# Patient Record
Sex: FEMALE | Race: WHITE | NOT HISPANIC OR LATINO | Employment: FULL TIME | URBAN - METROPOLITAN AREA
[De-identification: names, ages, dates, MRNs, and addresses within clinical notes are randomized per-mention and may not be internally consistent; named-entity substitution may affect disease eponyms.]

---

## 2020-12-04 DIAGNOSIS — Z01.84 ANTIBODY RESPONSE EXAMINATION: ICD-10-CM

## 2021-01-04 ENCOUNTER — IMMUNIZATION (OUTPATIENT)
Dept: PRIMARY CARE CLINIC | Facility: CLINIC | Age: 51
End: 2021-01-04
Payer: OTHER GOVERNMENT

## 2021-01-04 DIAGNOSIS — Z23 NEED FOR VACCINATION: ICD-10-CM

## 2021-01-04 PROCEDURE — 91300 COVID-19, MRNA, LNP-S, PF, 30 MCG/0.3 ML DOSE VACCINE: CPT | Mod: PBBFAC | Performed by: EMERGENCY MEDICINE

## 2021-01-28 ENCOUNTER — IMMUNIZATION (OUTPATIENT)
Dept: PRIMARY CARE CLINIC | Facility: CLINIC | Age: 51
End: 2021-01-28
Payer: OTHER GOVERNMENT

## 2021-01-28 DIAGNOSIS — Z23 NEED FOR VACCINATION: Primary | ICD-10-CM

## 2021-01-28 PROCEDURE — 0002A COVID-19, MRNA, LNP-S, PF, 30 MCG/0.3 ML DOSE VACCINE: ICD-10-PCS | Mod: CV19,S$GLB,, | Performed by: EMERGENCY MEDICINE

## 2021-01-28 PROCEDURE — 91300 COVID-19, MRNA, LNP-S, PF, 30 MCG/0.3 ML DOSE VACCINE: CPT | Mod: S$GLB,,, | Performed by: EMERGENCY MEDICINE

## 2021-01-28 PROCEDURE — 0002A COVID-19, MRNA, LNP-S, PF, 30 MCG/0.3 ML DOSE VACCINE: CPT | Mod: CV19,S$GLB,, | Performed by: EMERGENCY MEDICINE

## 2021-01-28 PROCEDURE — 91300 COVID-19, MRNA, LNP-S, PF, 30 MCG/0.3 ML DOSE VACCINE: ICD-10-PCS | Mod: S$GLB,,, | Performed by: EMERGENCY MEDICINE

## 2021-03-09 ENCOUNTER — CLINICAL SUPPORT (OUTPATIENT)
Dept: OTHER | Facility: CLINIC | Age: 51
End: 2021-03-09

## 2021-03-09 DIAGNOSIS — Z00.8 ENCOUNTER FOR OTHER GENERAL EXAMINATION: ICD-10-CM

## 2021-03-10 VITALS — HEIGHT: 63 IN | BODY MASS INDEX: 22.39 KG/M2

## 2021-03-10 LAB
GLUCOSE SERPL-MCNC: 87 MG/DL (ref 60–140)
HDLC SERPL-MCNC: 48 MG/DL
POC CHOLESTEROL, LDL (DOCK): 86 MG/DL
POC CHOLESTEROL, TOTAL: 170 MG/DL
TRIGL SERPL-MCNC: 179 MG/DL

## 2021-10-01 ENCOUNTER — IMMUNIZATION (OUTPATIENT)
Dept: PRIMARY CARE CLINIC | Facility: CLINIC | Age: 51
End: 2021-10-01
Payer: OTHER GOVERNMENT

## 2021-10-01 DIAGNOSIS — Z23 NEED FOR VACCINATION: Primary | ICD-10-CM

## 2021-10-01 PROCEDURE — 91300 COVID-19, MRNA, LNP-S, PF, 30 MCG/0.3 ML DOSE VACCINE: CPT | Mod: PBBFAC,PN

## 2022-01-05 DIAGNOSIS — R05.9 COUGH: Primary | ICD-10-CM

## 2022-01-05 PROCEDURE — 87811 SARS-COV-2 COVID19 W/OPTIC: CPT | Performed by: PREVENTIVE MEDICINE

## 2022-01-06 ENCOUNTER — LAB VISIT (OUTPATIENT)
Dept: PRIMARY CARE CLINIC | Facility: CLINIC | Age: 52
End: 2022-01-06
Payer: OTHER GOVERNMENT

## 2022-01-06 LAB
CTP QC/QA: YES
SARS-COV-2 AG RESP QL IA.RAPID: POSITIVE

## 2022-01-07 ENCOUNTER — PATIENT MESSAGE (OUTPATIENT)
Dept: INTERNAL MEDICINE | Facility: CLINIC | Age: 52
End: 2022-01-07
Payer: OTHER GOVERNMENT

## 2022-06-18 ENCOUNTER — HOSPITAL ENCOUNTER (INPATIENT)
Facility: HOSPITAL | Age: 52
LOS: 3 days | Discharge: HOME | DRG: 379 | End: 2022-06-21
Attending: FAMILY MEDICINE | Admitting: FAMILY MEDICINE
Payer: COMMERCIAL

## 2022-06-18 ENCOUNTER — ANESTHESIA EVENT (OUTPATIENT)
Dept: ENDOSCOPY | Facility: HOSPITAL | Age: 52
DRG: 379 | End: 2022-06-18
Payer: COMMERCIAL

## 2022-06-18 ENCOUNTER — ANESTHESIA (OUTPATIENT)
Dept: ENDOSCOPY | Facility: HOSPITAL | Age: 52
DRG: 379 | End: 2022-06-18
Payer: COMMERCIAL

## 2022-06-18 DIAGNOSIS — K92.2 UPPER GI BLEED: ICD-10-CM

## 2022-06-18 DIAGNOSIS — K29.90 GASTRITIS AND DUODENITIS: Primary | ICD-10-CM

## 2022-06-18 DIAGNOSIS — K26.9 DUODENAL ULCER: ICD-10-CM

## 2022-06-18 DIAGNOSIS — K92.0 COFFEE GROUND EMESIS: ICD-10-CM

## 2022-06-18 LAB
ABO + RH BLD: NORMAL
ALBUMIN SERPL BCP-MCNC: 3.1 G/DL (ref 3.5–5.2)
ALP SERPL-CCNC: 51 U/L (ref 55–135)
ALT SERPL W/O P-5'-P-CCNC: 9 U/L (ref 10–44)
ANION GAP SERPL CALC-SCNC: 10 MMOL/L (ref 8–16)
AST SERPL-CCNC: 12 U/L (ref 10–40)
BASOPHILS # BLD AUTO: 0.03 K/UL (ref 0–0.2)
BASOPHILS NFR BLD: 0.3 % (ref 0–1.9)
BASOPHILS NFR BLD: 0.4 % (ref 0–1.9)
BASOPHILS NFR BLD: 0.5 % (ref 0–1.9)
BILIRUB SERPL-MCNC: 0.4 MG/DL (ref 0.1–1)
BLD GP AB SCN CELLS X3 SERPL QL: NORMAL
BUN SERPL-MCNC: 35 MG/DL (ref 6–20)
CALCIUM SERPL-MCNC: 8 MG/DL (ref 8.7–10.5)
CHLORIDE SERPL-SCNC: 109 MMOL/L (ref 95–110)
CO2 SERPL-SCNC: 23 MMOL/L (ref 23–29)
CREAT SERPL-MCNC: 0.6 MG/DL (ref 0.5–1.4)
DIFFERENTIAL METHOD: ABNORMAL
EOSINOPHIL # BLD AUTO: 0.1 K/UL (ref 0–0.5)
EOSINOPHIL NFR BLD: 0.6 % (ref 0–8)
EOSINOPHIL NFR BLD: 1 % (ref 0–8)
EOSINOPHIL NFR BLD: 1.2 % (ref 0–8)
ERYTHROCYTE [DISTWIDTH] IN BLOOD BY AUTOMATED COUNT: 14 % (ref 11.5–14.5)
ERYTHROCYTE [DISTWIDTH] IN BLOOD BY AUTOMATED COUNT: 14.2 % (ref 11.5–14.5)
ERYTHROCYTE [DISTWIDTH] IN BLOOD BY AUTOMATED COUNT: 14.4 % (ref 11.5–14.5)
EST. GFR  (AFRICAN AMERICAN): >60 ML/MIN/1.73 M^2
EST. GFR  (NON AFRICAN AMERICAN): >60 ML/MIN/1.73 M^2
FERRITIN SERPL-MCNC: 9 NG/ML (ref 20–300)
GLUCOSE SERPL-MCNC: 102 MG/DL (ref 70–110)
HCT VFR BLD AUTO: 27.1 % (ref 37–48.5)
HCT VFR BLD AUTO: 27.2 % (ref 37–48.5)
HCT VFR BLD AUTO: 29 % (ref 37–48.5)
HGB BLD-MCNC: 8.6 G/DL (ref 12–16)
HGB BLD-MCNC: 8.8 G/DL (ref 12–16)
HGB BLD-MCNC: 9 G/DL (ref 12–16)
IMM GRANULOCYTES # BLD AUTO: 0.02 K/UL (ref 0–0.04)
IMM GRANULOCYTES # BLD AUTO: 0.03 K/UL (ref 0–0.04)
IMM GRANULOCYTES # BLD AUTO: 0.04 K/UL (ref 0–0.04)
IMM GRANULOCYTES NFR BLD AUTO: 0.2 % (ref 0–0.5)
IMM GRANULOCYTES NFR BLD AUTO: 0.5 % (ref 0–0.5)
IMM GRANULOCYTES NFR BLD AUTO: 0.5 % (ref 0–0.5)
INR PPP: 1.3 (ref 0.8–1.2)
IRON SERPL-MCNC: 148 UG/DL (ref 30–160)
LYMPHOCYTES # BLD AUTO: 1 K/UL (ref 1–4.8)
LYMPHOCYTES # BLD AUTO: 1.2 K/UL (ref 1–4.8)
LYMPHOCYTES # BLD AUTO: 1.5 K/UL (ref 1–4.8)
LYMPHOCYTES NFR BLD: 12 % (ref 18–48)
LYMPHOCYTES NFR BLD: 14.4 % (ref 18–48)
LYMPHOCYTES NFR BLD: 22 % (ref 18–48)
MCH RBC QN AUTO: 27.1 PG (ref 27–31)
MCH RBC QN AUTO: 27.6 PG (ref 27–31)
MCH RBC QN AUTO: 27.6 PG (ref 27–31)
MCHC RBC AUTO-ENTMCNC: 31 G/DL (ref 32–36)
MCHC RBC AUTO-ENTMCNC: 31.7 G/DL (ref 32–36)
MCHC RBC AUTO-ENTMCNC: 32.4 G/DL (ref 32–36)
MCV RBC AUTO: 85 FL (ref 82–98)
MCV RBC AUTO: 87 FL (ref 82–98)
MCV RBC AUTO: 87 FL (ref 82–98)
MONOCYTES # BLD AUTO: 0.4 K/UL (ref 0.3–1)
MONOCYTES # BLD AUTO: 0.5 K/UL (ref 0.3–1)
MONOCYTES # BLD AUTO: 0.6 K/UL (ref 0.3–1)
MONOCYTES NFR BLD: 5.2 % (ref 4–15)
MONOCYTES NFR BLD: 5.8 % (ref 4–15)
MONOCYTES NFR BLD: 7.3 % (ref 4–15)
NEUTROPHILS # BLD AUTO: 4.6 K/UL (ref 1.8–7.7)
NEUTROPHILS # BLD AUTO: 6.4 K/UL (ref 1.8–7.7)
NEUTROPHILS # BLD AUTO: 7 K/UL (ref 1.8–7.7)
NEUTROPHILS NFR BLD: 70 % (ref 38–73)
NEUTROPHILS NFR BLD: 76.4 % (ref 38–73)
NEUTROPHILS NFR BLD: 81.7 % (ref 38–73)
NRBC BLD-RTO: 0 /100 WBC
PLATELET # BLD AUTO: 441 K/UL (ref 150–450)
PLATELET # BLD AUTO: 506 K/UL (ref 150–450)
PLATELET # BLD AUTO: 507 K/UL (ref 150–450)
PMV BLD AUTO: 9.3 FL (ref 9.2–12.9)
PMV BLD AUTO: 9.7 FL (ref 9.2–12.9)
PMV BLD AUTO: 9.8 FL (ref 9.2–12.9)
POCT GLUCOSE: 82 MG/DL (ref 70–110)
POTASSIUM SERPL-SCNC: 3.4 MMOL/L (ref 3.5–5.1)
PROT SERPL-MCNC: 5.6 G/DL (ref 6–8.4)
PROTHROMBIN TIME: 12.8 SEC (ref 9–12.5)
RBC # BLD AUTO: 3.12 M/UL (ref 4–5.4)
RBC # BLD AUTO: 3.19 M/UL (ref 4–5.4)
RBC # BLD AUTO: 3.32 M/UL (ref 4–5.4)
SATURATED IRON: 38 % (ref 20–50)
SODIUM SERPL-SCNC: 142 MMOL/L (ref 136–145)
TOTAL IRON BINDING CAPACITY: 392 UG/DL (ref 250–450)
TRANSFERRIN SERPL-MCNC: 265 MG/DL (ref 200–375)
WBC # BLD AUTO: 6.58 K/UL (ref 3.9–12.7)
WBC # BLD AUTO: 8.38 K/UL (ref 3.9–12.7)
WBC # BLD AUTO: 8.6 K/UL (ref 3.9–12.7)

## 2022-06-18 PROCEDURE — 86901 BLOOD TYPING SEROLOGIC RH(D): CPT | Performed by: STUDENT IN AN ORGANIZED HEALTH CARE EDUCATION/TRAINING PROGRAM

## 2022-06-18 PROCEDURE — 25000003 PHARM REV CODE 250: Performed by: STUDENT IN AN ORGANIZED HEALTH CARE EDUCATION/TRAINING PROGRAM

## 2022-06-18 PROCEDURE — 85025 COMPLETE CBC W/AUTO DIFF WBC: CPT | Mod: 91 | Performed by: STUDENT IN AN ORGANIZED HEALTH CARE EDUCATION/TRAINING PROGRAM

## 2022-06-18 PROCEDURE — 43255 EGD CONTROL BLEEDING ANY: CPT | Performed by: INTERNAL MEDICINE

## 2022-06-18 PROCEDURE — 27200635: Performed by: INTERNAL MEDICINE

## 2022-06-18 PROCEDURE — 27200997: Performed by: INTERNAL MEDICINE

## 2022-06-18 PROCEDURE — 94761 N-INVAS EAR/PLS OXIMETRY MLT: CPT

## 2022-06-18 PROCEDURE — 86920 COMPATIBILITY TEST SPIN: CPT | Performed by: STUDENT IN AN ORGANIZED HEALTH CARE EDUCATION/TRAINING PROGRAM

## 2022-06-18 PROCEDURE — 27201038 HC PROBE, BI-POLAR: Performed by: INTERNAL MEDICINE

## 2022-06-18 PROCEDURE — 80053 COMPREHEN METABOLIC PANEL: CPT | Performed by: STUDENT IN AN ORGANIZED HEALTH CARE EDUCATION/TRAINING PROGRAM

## 2022-06-18 PROCEDURE — 37000009 HC ANESTHESIA EA ADD 15 MINS: Performed by: INTERNAL MEDICINE

## 2022-06-18 PROCEDURE — 37000008 HC ANESTHESIA 1ST 15 MINUTES: Performed by: INTERNAL MEDICINE

## 2022-06-18 PROCEDURE — 11000001 HC ACUTE MED/SURG PRIVATE ROOM

## 2022-06-18 PROCEDURE — 84466 ASSAY OF TRANSFERRIN: CPT | Performed by: STUDENT IN AN ORGANIZED HEALTH CARE EDUCATION/TRAINING PROGRAM

## 2022-06-18 PROCEDURE — 36415 COLL VENOUS BLD VENIPUNCTURE: CPT | Performed by: STUDENT IN AN ORGANIZED HEALTH CARE EDUCATION/TRAINING PROGRAM

## 2022-06-18 PROCEDURE — 82728 ASSAY OF FERRITIN: CPT | Performed by: STUDENT IN AN ORGANIZED HEALTH CARE EDUCATION/TRAINING PROGRAM

## 2022-06-18 PROCEDURE — 63600175 PHARM REV CODE 636 W HCPCS: Performed by: STUDENT IN AN ORGANIZED HEALTH CARE EDUCATION/TRAINING PROGRAM

## 2022-06-18 PROCEDURE — 99900035 HC TECH TIME PER 15 MIN (STAT)

## 2022-06-18 PROCEDURE — C9113 INJ PANTOPRAZOLE SODIUM, VIA: HCPCS | Performed by: STUDENT IN AN ORGANIZED HEALTH CARE EDUCATION/TRAINING PROGRAM

## 2022-06-18 PROCEDURE — 85610 PROTHROMBIN TIME: CPT | Performed by: STUDENT IN AN ORGANIZED HEALTH CARE EDUCATION/TRAINING PROGRAM

## 2022-06-18 RX ORDER — IBUPROFEN 200 MG
24 TABLET ORAL
Status: DISCONTINUED | OUTPATIENT
Start: 2022-06-18 | End: 2022-06-21 | Stop reason: HOSPADM

## 2022-06-18 RX ORDER — NALOXONE HCL 0.4 MG/ML
0.02 VIAL (ML) INJECTION
Status: DISCONTINUED | OUTPATIENT
Start: 2022-06-18 | End: 2022-06-21 | Stop reason: HOSPADM

## 2022-06-18 RX ORDER — HYDROCODONE BITARTRATE AND ACETAMINOPHEN 500; 5 MG/1; MG/1
TABLET ORAL
Status: DISCONTINUED | OUTPATIENT
Start: 2022-06-18 | End: 2022-06-21 | Stop reason: HOSPADM

## 2022-06-18 RX ORDER — AMOXICILLIN 250 MG
1 CAPSULE ORAL 2 TIMES DAILY
Status: DISCONTINUED | OUTPATIENT
Start: 2022-06-18 | End: 2022-06-18

## 2022-06-18 RX ORDER — TALC
6 POWDER (GRAM) TOPICAL NIGHTLY PRN
Status: DISCONTINUED | OUTPATIENT
Start: 2022-06-18 | End: 2022-06-21 | Stop reason: HOSPADM

## 2022-06-18 RX ORDER — ACETAMINOPHEN 325 MG/1
650 TABLET ORAL EVERY 8 HOURS PRN
Status: DISCONTINUED | OUTPATIENT
Start: 2022-06-18 | End: 2022-06-21 | Stop reason: HOSPADM

## 2022-06-18 RX ORDER — SODIUM CHLORIDE 0.9 % (FLUSH) 0.9 %
5 SYRINGE (ML) INJECTION
Status: DISCONTINUED | OUTPATIENT
Start: 2022-06-18 | End: 2022-06-21 | Stop reason: HOSPADM

## 2022-06-18 RX ORDER — MUPIROCIN 20 MG/G
OINTMENT TOPICAL 2 TIMES DAILY
Status: DISCONTINUED | OUTPATIENT
Start: 2022-06-18 | End: 2022-06-21 | Stop reason: HOSPADM

## 2022-06-18 RX ORDER — ONDANSETRON 8 MG/1
8 TABLET, ORALLY DISINTEGRATING ORAL EVERY 8 HOURS PRN
Status: DISCONTINUED | OUTPATIENT
Start: 2022-06-18 | End: 2022-06-21 | Stop reason: HOSPADM

## 2022-06-18 RX ORDER — PANTOPRAZOLE SODIUM 40 MG/10ML
40 INJECTION, POWDER, LYOPHILIZED, FOR SOLUTION INTRAVENOUS 2 TIMES DAILY
Status: DISCONTINUED | OUTPATIENT
Start: 2022-06-18 | End: 2022-06-21 | Stop reason: HOSPADM

## 2022-06-18 RX ORDER — IBUPROFEN 200 MG
16 TABLET ORAL
Status: DISCONTINUED | OUTPATIENT
Start: 2022-06-18 | End: 2022-06-21 | Stop reason: HOSPADM

## 2022-06-18 RX ORDER — PANTOPRAZOLE SODIUM 40 MG/10ML
40 INJECTION, POWDER, LYOPHILIZED, FOR SOLUTION INTRAVENOUS 2 TIMES DAILY
Status: DISCONTINUED | OUTPATIENT
Start: 2022-06-18 | End: 2022-06-18

## 2022-06-18 RX ORDER — DEXTROMETHORPHAN/PSEUDOEPHED 2.5-7.5/.8
DROPS ORAL
Status: COMPLETED | OUTPATIENT
Start: 2022-06-18 | End: 2022-06-18

## 2022-06-18 RX ORDER — PROPOFOL 10 MG/ML
VIAL (ML) INTRAVENOUS
Status: DISCONTINUED | OUTPATIENT
Start: 2022-06-18 | End: 2022-06-18

## 2022-06-18 RX ORDER — LIDOCAINE HYDROCHLORIDE 20 MG/ML
INJECTION, SOLUTION EPIDURAL; INFILTRATION; INTRACAUDAL; PERINEURAL
Status: DISCONTINUED | OUTPATIENT
Start: 2022-06-18 | End: 2022-06-18

## 2022-06-18 RX ORDER — ACETAMINOPHEN 325 MG/1
650 TABLET ORAL EVERY 4 HOURS PRN
Status: DISCONTINUED | OUTPATIENT
Start: 2022-06-18 | End: 2022-06-21 | Stop reason: HOSPADM

## 2022-06-18 RX ORDER — SODIUM CHLORIDE 0.9 % (FLUSH) 0.9 %
3 SYRINGE (ML) INJECTION
Status: DISCONTINUED | OUTPATIENT
Start: 2022-06-18 | End: 2022-06-21 | Stop reason: HOSPADM

## 2022-06-18 RX ORDER — GLUCAGON 1 MG
1 KIT INJECTION
Status: DISCONTINUED | OUTPATIENT
Start: 2022-06-18 | End: 2022-06-21 | Stop reason: HOSPADM

## 2022-06-18 RX ADMIN — ACETAMINOPHEN 650 MG: 325 TABLET ORAL at 04:06

## 2022-06-18 RX ADMIN — PROPOFOL 50 MG: 10 INJECTION, EMULSION INTRAVENOUS at 11:06

## 2022-06-18 RX ADMIN — SIMETHICONE 40 MG: 20 SUSPENSION/ DROPS ORAL at 11:06

## 2022-06-18 RX ADMIN — PROPOFOL 20 MG: 10 INJECTION, EMULSION INTRAVENOUS at 11:06

## 2022-06-18 RX ADMIN — PANTOPRAZOLE SODIUM 40 MG: 40 INJECTION, POWDER, FOR SOLUTION INTRAVENOUS at 04:06

## 2022-06-18 RX ADMIN — PROPOFOL 30 MG: 10 INJECTION, EMULSION INTRAVENOUS at 11:06

## 2022-06-18 RX ADMIN — SODIUM CHLORIDE: 0.9 INJECTION, SOLUTION INTRAVENOUS at 10:06

## 2022-06-18 RX ADMIN — PROPOFOL 40 MG: 10 INJECTION, EMULSION INTRAVENOUS at 11:06

## 2022-06-18 RX ADMIN — MUPIROCIN: 20 OINTMENT TOPICAL at 08:06

## 2022-06-18 RX ADMIN — IRON SUCROSE 500 MG: 20 INJECTION, SOLUTION INTRAVENOUS at 08:06

## 2022-06-18 RX ADMIN — PANTOPRAZOLE SODIUM 8 MG/HR: 40 INJECTION, POWDER, FOR SOLUTION INTRAVENOUS at 10:06

## 2022-06-18 RX ADMIN — PANTOPRAZOLE SODIUM 8 MG/HR: 40 INJECTION, POWDER, FOR SOLUTION INTRAVENOUS at 04:06

## 2022-06-18 RX ADMIN — PANTOPRAZOLE SODIUM 8 MG/HR: 40 INJECTION, POWDER, FOR SOLUTION INTRAVENOUS at 05:06

## 2022-06-18 RX ADMIN — LIDOCAINE HYDROCHLORIDE 80 MG: 20 INJECTION, SOLUTION EPIDURAL; INFILTRATION; INTRACAUDAL; PERINEURAL at 11:06

## 2022-06-18 NOTE — PLAN OF CARE
Report received from JOELLEN Mayfield. VSS, AAOx4. Patient is vaccinated, NPO since MN. Patient refusing pregnancy test, informed Dr. Monet with anesthesia and she states that it is ok.

## 2022-06-18 NOTE — CONSULTS
[facility]    CC: Coffee-ground emesis     HPI 51 y.o. female with history of anemia and migraine headaches presenting with acute-onset, large-volume coffee ground emesis with associated diaphoresis, blurred vision, nausea and burning epigastric pain. She was in her normal state of health prior to this episode and denies any prior similar episodes. She admits to having dark stools last night but denies any melena or hematochezia. She takes Excedrin at least once daily for migraines but denies any anticoagulation use. She has a history of anemia but has not taken any iron pills in a few years. She has never had an EGD before.     Chart reviewed and summarized here.    Past Medical History  Past Medical History:   Diagnosis Date    Anemia     Hypothyroidism        Past Surgical History  Past Surgical History:   Procedure Laterality Date    SINUS SURGERY         Social History  Social History     Tobacco Use    Smoking status: Never Smoker    Smokeless tobacco: Never Used   Substance Use Topics    Alcohol use: No    Drug use: No       Family History  No family history of gastrointestinal cancer.     Review of Systems  General ROS: Positive for diaphoresis; negative for chills, fever or weight loss  Psychological ROS: negative for hallucination, depression   Ophthalmic ROS: Positive for blurry vision; negative for photophobia  ENT ROS: negative for epistaxis, sore throat or rhinorrhea  Respiratory ROS: no cough, shortness of breath, or wheezing  Cardiovascular ROS: no chest pain or dyspnea on exertion  Gastrointestinal ROS: Positive for coffee-ground emesis, nausea, and abdominal pain; nochange in bowel habits or black/ bloody stools  Genito-Urinary ROS: no dysuria, trouble voiding, or hematuria  Musculoskeletal ROS: negative for gait disturbance or muscular weakness  Neurological ROS: Positive for lightheadedness; no syncope or seizures; no ataxia  Dermatological ROS: negative for pruritis, rash and  "jaundice    Physical Examination  /77   Pulse 82   Temp 98.5 °F (36.9 °C) (Oral)   Resp 12   Ht 5' 3" (1.6 m)   Wt 61.5 kg (135 lb 9.3 oz)   SpO2 97%   Breastfeeding No   BMI 24.02 kg/m²   General appearance: alert, cooperative, no distress  HENT: Normocephalic, atraumatic, neck symmetrical, no nasal discharge   Eyes: conjunctivae/corneas clear, PERRL, EOM's intact  Lungs: clear to auscultation bilaterally, no dullness to percussion bilaterally  Heart: regular rate and rhythm without rub; no displacement of the PMI   Abdomen: soft, non-tender; bowel sounds normoactive; no organomegaly  Extremities: extremities symmetric; no clubbing, cyanosis, or edema  Integument: Skin color, texture, turgor normal; no rashes; hair distrubution normal  Neurologic: Alert and oriented X 3, normal strength, normal coordination   Psychiatric: no pressured speech; normal affect; no evidence of impaired cognition     Labs:  Hemoglobin 8.8 (baseline ~13)  Platelets 506  BUN 35  Creatinine 0.6    Imaging:  CT head Wo contrast without any acute intracranial process. No evidence of epidural or subdural hematoma.    I have personally reviewed and interpreted these images.    Assessment:   51 y.o. female with history of migraine headaches presenting with coffee-ground emesis with associated decreased hemoglobin from baseline in the setting of regular NSAID use. Differential includes PUD vs. Dieulafoy lesion vs. Angioectasia.     Plan:   - Transfuse PRBCs for hemoglobin <7  - IV PPI BID  - NPO for EGD   - Outpatient colonoscopy for screening purposes    Case discussed with Dr. Evangelista.     Davy Palacios MD  [facility] Fellow, PGY-4    "

## 2022-06-18 NOTE — EICU
EICU BRIEF ADMIT NOTE:    Reason for ICU admission:  GI bleeding    Please refer to admission H&P for details.     Comfortably lying in the bed.  Not in distress.    Chart reviewed: yes  Recent MD notes reviewed: Yes  Labs results reviewed: yes  Radiology: Chest images reviewed. Reports for others reviewed.  Telemetry tracing: yes  Evaluation via interactive audio and video telecommunications: yes comfortably lying in the bed.  Not in distress.  Communicated issues/orders/plan with: bedside ICU RN    Best Practices Review:  Stress ulcer prophylaxis: PPI  DVT prophylaxis: patient is at risk of bleeding  Blood glucose monitoring: Ordered        Ventilator review:  Intubated : No      Assessment & Plan:     Impression:  GI bleeding likely upper in nature      Plan:  Continue to monitor H and H every 6 hours.  Transfuse PRBC if indicated.  Continue pantoprazole.  Keep her NPO.  GI consult for possible endoscopy.  Zofran p.r.n. for nausea vomiting.  Continue to monitor hemodynamics closely.      Thank you for allowing the EICU to participate in your patient's care. Please feel free to call us as needed.     I have encourage bedside RN to call me with the results of labs/imaging if ordered.         Nicolette Escalante MD  [facility]  [phone]

## 2022-06-18 NOTE — LETTER
June 21, 2022                   [facility]  [address]  [place]  [address]  Phone: [phone]  Fax: [phone] June 21, 2022     Patient: Lila Gee   YOB: 1970       To Whom It May Concern:    Lila Gee was admitted to the hospital on 6/18/2022  2:49 AM and discharged on .  She may return with no restrictions on 6/22/2022.  If you have any questions or concerns, please don't hesitate to call Dr. Sabrina Starks MD, [facility] office at [phone].      Sincerely,        Sabrina Starks MD  Department of Hospital Medicine

## 2022-06-18 NOTE — TRANSFER OF CARE
"Anesthesia Transfer of Care Note    Patient: Lila Gee    Procedure(s) Performed: Procedure(s) (LRB):  EGD (ESOPHAGOGASTRODUODENOSCOPY) (N/A)    Patient location: ICU    Anesthesia Type: MAC    Transport from OR: Transported from OR on 6-10 L/min O2 by face mask with adequate spontaneous ventilation    Post pain: adequate analgesia    Post assessment: no apparent anesthetic complications and tolerated procedure well    Post vital signs: stable    Level of consciousness: awake and alert    Nausea/Vomiting: no nausea/vomiting    Complications: none    Transfer of care protocol was followed      Last vitals:   Visit Vitals  /69   Pulse 82   Temp 36.7 °C (98.1 °F) (Oral)   Resp (!) 9   Ht 5' 3" (1.6 m)   Wt 61.5 kg (135 lb 9.3 oz)   SpO2 100%   Breastfeeding No   BMI 24.02 kg/m²     "

## 2022-06-18 NOTE — NURSING
Patient back from EGD, see progress notes, patient with bite block in place, removed at bedside by RN, VSS.

## 2022-06-18 NOTE — PLAN OF CARE
[facility] Plan of Care Note:    EGD performed with findings below:  - Normal esophagus.   - Erythematous mucosa in the antrum with superficial erosions.   - Non-bleeding duodenal ulcer with a clean ulcer base (Frank Class III).   - Non-bleeding duodenal ulcer with a nonbleeding visible vessel (Frank Class IIa).  Treated with bipolar cautery and hemostatic clips x2.  hemostatic spray applied for hemostasis with no bleeding on completion of the procedure.     Plan:  - Would obtain iron panel, ferritin; replete with oral iron if evidence of iron deficiency   - Continue present medications including IV PPI BID for 72 hours total. After 72 hours, transition to oral PPI BID for 8 weeks.   - Clear liquid diet today; advance diet as tolerated  - Avoid all NSAID medications. Tylenol as needed for headaches (up to 2g daily).    Davy Palacios MD  [facility] Fellow, PGY-4

## 2022-06-18 NOTE — H&P
[facility]  History & Physical    Patient Name: Lila Gee  MRN: [id number]  Patient Class: IP- Inpatient  Admission Date: 6/18/2022  Attending Physician: Alexandra Guadarrama MD   Primary Care Provider: Primary Doctor No         Patient information was obtained from patient and ER records.     Subjective:     Principal Problem:Upper GI bleed    Chief Complaint: No chief complaint on file.       HPI: Patient is a 51 year old female with Pmhx of headaches and thrombocytosis presenting to OSH for same day onset of abdominal pain with emesis. Patient reports she was at work when she developed quick onset of nausea, coffee ground emesis and diaphoresis. Patient immediately wheeled to the ED and workup included CT head without contrast that was negative. Occult blood positive. Patients Hgb 9.4/29.9; NGT placed and noted dark ground return with BR blood. Patient typed and screened. 2 bore iv's obtained and PPI IV started. Patient transferred to [facility] for ICU admission for upper GI bleed.     Patient denies any previous episodes of hemoptysis or GI bleeds. Patient reports over 10 years ago of BC goodys powder use. Patient denies any use currently. Patient reports previously worked up for bleeding, with no source noted or discovered. Patient discharged on iron supplements. Patient reports following with PCP and informed to stop taking po Iron supplements. Patient reports previously followed by heme/onc for thrombocytosis. Patient denies any alcohol use, denies any tobacco use or drug use. Patient denies any NSAID use as well.       Past Medical History:   Diagnosis Date    Anemia     Hypothyroidism        Past Surgical History:   Procedure Laterality Date    SINUS SURGERY         Review of patient's allergies indicates:  No Known Allergies    Current Facility-Administered Medications on File Prior to Encounter   Medication    [COMPLETED] LIDOcaine HCl 2% oral solution 5 mL    [COMPLETED]  ondansetron injection 4 mg    [COMPLETED] pantoprazole injection 80 mg    [COMPLETED] sodium chloride 0.9% bolus 1,000 mL    [DISCONTINUED] pantoprazole 40 mg in  mL infusion (ready to mix system)     Current Outpatient Medications on File Prior to Encounter   Medication Sig    [DISCONTINUED] aspirin (ECOTRIN) 81 MG EC tablet Take 1 tablet (81 mg total) by mouth once daily.    [DISCONTINUED] ferrous sulfate 325 mg (65 mg iron) Tab tablet Take 325 mg by mouth daily with breakfast.     Family History       Problem Relation (Age of Onset)    Diabetes Brother    Fibromyalgia Mother, Sister    Irritable bowel syndrome Mother          Tobacco Use    Smoking status: Never Smoker    Smokeless tobacco: Never Used   Substance and Sexual Activity    Alcohol use: No    Drug use: No    Sexual activity: Not Currently     Partners: Male     Birth control/protection: Condom     Review of Systems   Constitutional:  Negative for chills and fever.   HENT:  Negative for ear pain and sore throat.    Eyes:  Negative for discharge and redness.   Respiratory:  Negative for cough and chest tightness.    Cardiovascular:  Negative for chest pain and leg swelling.   Gastrointestinal:  Positive for abdominal pain, blood in stool, nausea and vomiting. Negative for diarrhea.   Genitourinary:  Negative for dysuria and hematuria.   Musculoskeletal:  Negative for back pain and myalgias.   Skin:  Negative for pallor and rash.   Neurological:  Negative for dizziness and headaches.   Psychiatric/Behavioral:  Negative for confusion. The patient is not nervous/anxious.    Objective:     Vital Signs (Most Recent):  Temp: 98.5 °F (36.9 °C) (06/18/22 0318)  Pulse: 95 (06/18/22 0318)  Resp: 18 (06/18/22 0318)  BP: 123/81 (06/18/22 0318)  SpO2: 100 % (06/18/22 0318) Vital Signs (24h Range):  Temp:  [97.7 °F (36.5 °C)-98.5 °F (36.9 °C)] 98.5 °F (36.9 °C)  Pulse:  [66-95] 95  Resp:  [18-20] 18  SpO2:  [95 %-100 %] 100 %  BP: ()/(42-96)  123/81     Weight: 61.5 kg (135 lb 9.3 oz)  Body mass index is 24.02 kg/m².    Physical Exam  Vitals reviewed.   Constitutional:       General: She is not in acute distress.     Appearance: Normal appearance.   HENT:      Head: Normocephalic and atraumatic.      Nose:      Comments: NGTube in place  Eyes:      General:         Right eye: No discharge.         Left eye: No discharge.      Extraocular Movements: Extraocular movements intact.      Pupils: Pupils are equal, round, and reactive to light.   Cardiovascular:      Rate and Rhythm: Normal rate and regular rhythm.      Pulses: Normal pulses.      Heart sounds: No murmur heard.  Pulmonary:      Effort: Pulmonary effort is normal. No respiratory distress.      Breath sounds: Normal breath sounds. No wheezing.   Abdominal:      General: Abdomen is flat. Bowel sounds are normal.      Palpations: Abdomen is soft.      Tenderness: There is no abdominal tenderness.   Musculoskeletal:         General: No swelling or tenderness.   Skin:     Capillary Refill: Capillary refill takes less than 2 seconds.      Coloration: Skin is not jaundiced.   Neurological:      General: No focal deficit present.      Mental Status: She is alert and oriented to person, place, and time.   Psychiatric:         Mood and Affect: Mood normal.         Behavior: Behavior normal.         CRANIAL NERVES     CN III, IV, VI   Pupils are equal, round, and reactive to light.     Significant Labs: All pertinent labs within the past 24 hours have been reviewed.  A1C: No results for input(s): HGBA1C in the last 4320 hours.  CBC:   Recent Labs   Lab 06/17/22  1628 06/17/22  1854 06/18/22  0036   WBC 6.61  --  6.92   HGB 9.4* 10.4* 10.1*   HCT 29.9* 33.6* 32.7*   *  --  573*     CMP:   Recent Labs   Lab 06/17/22  1628      K 3.6      CO2 23   *   BUN 45*   CREATININE 0.7   CALCIUM 7.7*   PROT 5.7*   ALBUMIN 3.2*   BILITOT 0.3   ALKPHOS 50*   AST 12   ALT 12   ANIONGAP 11    EGFRNONAA >60.0     Lipase:   Recent Labs   Lab 06/17/22  1628   LIPASE 21     Magnesium: No results for input(s): MG in the last 48 hours.  TSH:   Recent Labs   Lab 06/17/22  1628   TSH 3.990       Significant Imaging: I have reviewed all pertinent imaging results/findings within the past 24 hours.    Assessment/Plan:     * Upper GI bleed  Onset prior to arrival  Associated with diaphoresis, coffee ground emesis with BRB emesis  In ED, NGT placed. Hg trended 9.4>10.4>10.1  PPI IV continuous  Maintain 2 bore ivs  Trend H/H q 6hrs  Maintain active type and screen  NGT to intermittent suction  GI consulted, recs appreciated          VTE Risk Mitigation (From admission, onward)         Ordered     IP VTE LOW RISK PATIENT  Once         06/18/22 0253     Place sequential compression device  Until discontinued         06/18/22 0253              Critical care time spent on the evaluation and treatment of severe organ dysfunction, review of pertinent labs and imaging studies, discussions with consulting providers and discussions with patient/family: 45 minutes.     Lata Huddleston MD  Department of Hospital Medicine   [place] - Intensive Care

## 2022-06-18 NOTE — LETTER
June 21, 2022                 [facility]  [address]  [place]  [address]  Phone: [phone]  Fax: [phone] June 21, 2022     Patient: Lila Gee   YOB: 1970       To Whom It May Concern:    Lila Gee was admitted to the hospital on 6/18/2022  2:49 AM and discharged on .  She may return with no restrictions on 6/24/2022.  If you have any questions or concerns, please don't hesitate to call Dr. Sabrina Starks MD, [facility] office at [phone].      Sincerely,        Sabrina Starks MD  Department of Hospital Medicine

## 2022-06-18 NOTE — ANESTHESIA PREPROCEDURE EVALUATION
06/18/2022  Lila Gee is a 51 y.o., female who presented to the ED with coffee ground emesis, scheduled for EGD.    Past Medical History:   Diagnosis Date    Anemia     Hypothyroidism        Past Surgical History:   Procedure Laterality Date    SINUS SURGERY         Pre-op Assessment    I have reviewed the Patient Summary Reports.     I have reviewed the Nursing Notes. I have reviewed the NPO Status.   I have reviewed the Medications.     Review of Systems  Anesthesia Hx:  No problems with previous Anesthesia  History of prior surgery of interest to airway management or planning: (sinus surgery) Denies Family Hx of Anesthesia complications.   Denies Personal Hx of Anesthesia complications.   Social:  Non-Smoker, No Alcohol Use    Hematology/Oncology:     Oncology Normal    -- Anemia: Hematology Comments: thrombocytosis    EENT/Dental:EENT/Dental Normal   Cardiovascular:  Cardiovascular Normal Exercise tolerance: good   Denies Angina.    Pulmonary:  Pulmonary Normal  Denies Shortness of breath.    Renal/:  Renal/ Normal     Hepatic/GI:   Upper GI bleed; Hx of frequent Excedrin use (contains aspirin).   Musculoskeletal:  Musculoskeletal Normal    Neurological:  Neurology Normal    Endocrine:   Hypothyroidism    Dermatological:  Skin Normal    Psych:  Psychiatric Normal           Physical Exam  General: Well nourished, Cooperative, Alert and Oriented    Airway:  Mallampati: II   Mouth Opening: Normal  TM Distance: Normal  Tongue: Normal  Neck ROM: Normal ROM    Dental:        Anesthesia Plan  Type of Anesthesia, risks & benefits discussed:    Anesthesia Type: Gen ETT, MAC  Intra-op Monitoring Plan: Standard ASA Monitors  Post Op Pain Control Plan: multimodal analgesia  Induction:  IV  Airway Plan: Direct  Informed Consent: Informed consent signed with the Patient and all parties understand the risks and  agree with anesthesia plan.  All questions answered.   ASA Score: 3    Ready For Surgery From Anesthesia Perspective.     .

## 2022-06-18 NOTE — CONSULTS
"Pulm/CC Fellow Consult Note    Attending Physician: Alexandra Guadarrama MD      Date of Admit: 2022    Chief Complaint: Hematemesis     History of Present Illness:  Lila Gee is a 51 y.o.  female with a PMHx of headaches and reported thrombocytosis who presented for new onset abdominal pain and bloody emesis. Patient does report a 10 year history of BC goodys powder use, was worked up for GIB in the past but no clear source identified. Patient noted to have a hgb of 9.4 and was transiently hypotensive, but responded to a bolus of NS. Patient currently only complaining of pain from her NGT.     Past Medical History:  Past Medical History:   Diagnosis Date    Anemia     Hypothyroidism        Past Surgical History:  Past Surgical History:   Procedure Laterality Date    SINUS SURGERY         Allergies:  Review of patient's allergies indicates:  No Known Allergies      Family History:  Family History   Problem Relation Age of Onset    Fibromyalgia Mother     Irritable bowel syndrome Mother     Fibromyalgia Sister     Diabetes Brother        Social History:  Social History     Tobacco Use    Smoking status: Never Smoker    Smokeless tobacco: Never Used   Substance Use Topics    Alcohol use: No    Drug use: No       Review of Systems:  Comprehensive ROS negative except as per HPI       Objective:   Last 24 Hour Vital Signs:  BP  Min: 72/47  Max: 161/93  Temp  Av °F (36.7 °C)  Min: 97.6 °F (36.4 °C)  Max: 98.5 °F (36.9 °C)  Pulse  Av.8  Min: 66  Max: 116  Resp  Av.1  Min: 9  Max: 42  SpO2  Av.2 %  Min: 90 %  Max: 100 %  Height  Av' 3" (160 cm)  Min: 5' 3" (160 cm)  Max: 5' 3" (160 cm)  Weight  Av.2 kg (137 lb 3.2 oz)  Min: 61.5 kg (135 lb 9.3 oz)  Max: 62.6 kg (138 lb 0.1 oz)  Body mass index is 24.02 kg/m².  I/O last 3 completed shifts:  In: -   Out: 30 [Drains:30]    Physical Exam:  General: Alert and awake in NAD  HENT:  NCAT; anicteric sclera; OP clear with MMM. NGT in " place  Cardio:  Regular rate and rhythm with normal S1 and S2; no murmurs or rubs  Resp:  CTAB; respirations unlabored; no wheezes, crackles or rhonchi  Abdom:  Soft, NTND with normoactive bowel sounds  Extrem:  WWP with no clubbing, cyanosis or edema  Pulses:  2+ and symmetric distally  Neuro:  AAOx3; cooperative and pleasant with no focal deficits    Personal Review and Summary of Interval Diagnostics    Laboratory Studies:   No results for input(s): PH, PCO2, PO2, HCO3, POCSATURATED, BE in the last 24 hours.  Recent Labs   Lab 06/18/22  0536   WBC 6.58   RBC 3.19*   HGB 8.8*   HCT 27.2*   *   MCV 85   MCH 27.6   MCHC 32.4     Recent Labs   Lab 06/18/22  0536      K 3.4*      CO2 23   BUN 35*   CREATININE 0.6       Microbiology Data:   Microbiology Results (last 7 days)     ** No results found for the last 168 hours. **             Assessment & Plan:     Lila Gee is a 51 y.o.  female with a PMHx of headaches and reported thrombocytosis who presented for new onset abdominal pain and bloody emesis.    #UGIB  - GI consulted  - Hemodynamically stable.  - maintain 2 large bore IVs  - Suspected PUD 2/2 aspirin use. Avoid aspirins and NSAIDs  - Hemodynamically stable. If remains so after scope would recommend step down to the floor      Thank you for the consult, please call with any questions.     Monisha Phillips MD  Pulmonary and Critical Care Fellow  06/18/2022  1:30 PM    Pt seen and examined with Pulmonary/Critical Care team and this note reviewed and validated with the following additional comments:    Hx sounds like NSAID-induced gastritis of ulcer. Hemodynamically stable.  2 large bore IVs. No active bleeding at present.  EGD pending this AM.    Herson Douglas MD  Phone [phone]

## 2022-06-18 NOTE — HPI
Patient is a 51 year old female with Pmhx of headaches and thrombocytosis presenting to OSH for same day onset of abdominal pain with emesis. Patient reports she was at work when she developed quick onset of nausea, coffee ground emesis and diaphoresis. Patient immediately wheeled to the ED and workup included CT head without contrast that was negative. Occult blood positive. Patients Hgb 9.4/29.9; NGT placed and noted dark ground return with BR blood. Patient typed and screened. 2 bore iv's obtained and PPI IV started. Patient transferred to [facility] for ICU admission for upper GI bleed.     Patient denies any previous episodes of hemoptysis or GI bleeds. Patient reports over 10 years ago of BC goodys powder use. Patient denies any use currently. Patient reports previously worked up for bleeding, with no source noted or discovered. Patient discharged on iron supplements. Patient reports following with PCP and informed to stop taking po Iron supplements. Patient reports previously followed by heme/onc for thrombocytosis. Patient denies any alcohol use, denies any tobacco use or drug use. Patient denies any NSAID use as well.

## 2022-06-18 NOTE — PROGRESS NOTES
Pt arrived to unit. Introduced self as VN for this shift. Admission questions completed by VN. Educated pt on VTE risk, safety precautions, and VN's role in pt care. Opportunity given for pt's questions. All questions answered.      06/18/22 1717   Admission   Initial VN Admission Questions Complete   Communication Issues? None   Shift   Virtual Nurse - Rounding Complete   Virtual Nurse - Patient Verbalized Approval Of Camera Use;VN Rounding   Type of Frequent Check   Type Other (see comments)  (Admission)   Safety/Activity   Patient Rounds bed in low position;call light in patient/parent reach   Safety Promotion/Fall Prevention side rails raised x 2   Positioning   Body Position sitting up in bed   Head of Bed (HOB) Positioning HOB at 60-90 degrees

## 2022-06-18 NOTE — SUBJECTIVE & OBJECTIVE
Past Medical History:   Diagnosis Date    Anemia     Hypothyroidism        Past Surgical History:   Procedure Laterality Date    SINUS SURGERY         Review of patient's allergies indicates:  No Known Allergies    No current facility-administered medications on file prior to encounter.     No current outpatient medications on file prior to encounter.     Family History       Problem Relation (Age of Onset)    Diabetes Brother    Fibromyalgia Mother, Sister    Irritable bowel syndrome Mother          Tobacco Use    Smoking status: Never Smoker    Smokeless tobacco: Never Used   Substance and Sexual Activity    Alcohol use: No    Drug use: No    Sexual activity: Not Currently     Partners: Male     Birth control/protection: Condom     Review of Systems   Constitutional:  Negative for chills and fever.   HENT:  Negative for ear pain and sore throat.    Eyes:  Negative for discharge and redness.   Respiratory:  Negative for cough and chest tightness.    Cardiovascular:  Negative for chest pain and leg swelling.   Gastrointestinal:  Positive for abdominal pain, blood in stool, nausea and vomiting. Negative for diarrhea.   Genitourinary:  Negative for dysuria and hematuria.   Musculoskeletal:  Negative for back pain and myalgias.   Skin:  Negative for pallor and rash.   Neurological:  Negative for dizziness and headaches.   Psychiatric/Behavioral:  Negative for confusion. The patient is not nervous/anxious.    Objective:     Vital Signs (Most Recent):  Temp: 98.4 °F (36.9 °C) (06/19/22 0429)  Pulse: 76 (06/19/22 0429)  Resp: 17 (06/19/22 0429)  BP: 103/70 (06/19/22 0429)  SpO2: 96 % (06/19/22 0824) Vital Signs (24h Range):  Temp:  [97.6 °F (36.4 °C)-98.9 °F (37.2 °C)] 98.4 °F (36.9 °C)  Pulse:  [76-96] 76  Resp:  [9-20] 17  SpO2:  [89 %-100 %] 96 %  BP: (103-116)/(62-79) 103/70     Weight: 66.5 kg (146 lb 9.7 oz)  Body mass index is 25.97 kg/m².    Physical Exam  Vitals reviewed.   Constitutional:       General: She is  not in acute distress.     Appearance: Normal appearance.   HENT:      Head: Normocephalic and atraumatic.   Eyes:      General:         Right eye: No discharge.         Left eye: No discharge.      Extraocular Movements: Extraocular movements intact.      Pupils: Pupils are equal, round, and reactive to light.   Cardiovascular:      Rate and Rhythm: Normal rate and regular rhythm.      Pulses: Normal pulses.      Heart sounds: No murmur heard.  Pulmonary:      Effort: Pulmonary effort is normal. No respiratory distress.      Breath sounds: Normal breath sounds. No wheezing.   Abdominal:      General: Abdomen is flat. Bowel sounds are normal.      Palpations: Abdomen is soft.      Tenderness: There is no abdominal tenderness.   Musculoskeletal:         General: No swelling or tenderness.   Skin:     Capillary Refill: Capillary refill takes less than 2 seconds.      Coloration: Skin is not jaundiced.   Neurological:      General: No focal deficit present.      Mental Status: She is alert and oriented to person, place, and time.   Psychiatric:         Mood and Affect: Mood normal.         Behavior: Behavior normal.         CRANIAL NERVES     CN III, IV, VI   Pupils are equal, round, and reactive to light.     Significant Labs: All pertinent labs within the past 24 hours have been reviewed.  A1C: No results for input(s): HGBA1C in the last 4320 hours.  CBC:   Recent Labs   Lab 06/18/22  1832 06/18/22  2354 06/19/22  0547   WBC 8.38 6.05 5.27   HGB 9.0* 8.2* 8.3*   HCT 29.0* 24.7* 25.4*   * 457* 423     CMP:   Recent Labs   Lab 06/17/22  1628 06/18/22  0536 06/19/22  0210    142 139   K 3.6 3.4* 3.6    109 105   CO2 23 23 24   * 102 88   BUN 45* 35* 15   CREATININE 0.7 0.6 0.6   CALCIUM 7.7* 8.0* 8.2*   PROT 5.7* 5.6* 5.5*   ALBUMIN 3.2* 3.1* 3.2*   BILITOT 0.3 0.4 0.4   ALKPHOS 50* 51* 49*   AST 12 12 12   ALT 12 9* 9*   ANIONGAP 11 10 10   EGFRNONAA >60.0 >60 >60     Lipase:   Recent Labs    Lab 06/17/22  1628   LIPASE 21     Magnesium: No results for input(s): MG in the last 48 hours.  TSH:   Recent Labs   Lab 06/17/22  1628   TSH 3.990       Significant Imaging: I have reviewed all pertinent imaging results/findings within the past 24 hours.

## 2022-06-18 NOTE — ASSESSMENT & PLAN NOTE
Onset prior to arrival  Associated with diaphoresis, coffee ground emesis with BRB emesis  In ED, NGT placed. Hg trended 9.4>10.4>10.1  Repeat Hgb 8.8 > 8.3  Maintain 2 bore ivs  Trend H/H q 6hrs  Maintain active type and screen    GI consulted  EGD performed 6/18 with findings below:  - Normal esophagus.   - Erythematous mucosa in the antrum with superficial erosions.   - Non-bleeding duodenal ulcer with a clean ulcer base (Frank Class III).   - Non-bleeding duodenal ulcer with a nonbleeding visible vessel (Frank Class IIa).  Treated with bipolar cautery and hemostatic clips x2.  hemostatic spray applied for hemostasis with no bleeding on completion of the procedure.      - IV Venofer 500mg day 2 of 2  - IV PPI BID for 72 hours total. After 72 hours, transition to oral PPI BID for 8 weeks.   - Advance diet as tolerated  - Avoid all NSAID medications. Tylenol as needed for headaches (up to 2g daily).

## 2022-06-19 LAB
ALBUMIN SERPL BCP-MCNC: 3.2 G/DL (ref 3.5–5.2)
ALP SERPL-CCNC: 49 U/L (ref 55–135)
ALT SERPL W/O P-5'-P-CCNC: 9 U/L (ref 10–44)
ANION GAP SERPL CALC-SCNC: 10 MMOL/L (ref 8–16)
AST SERPL-CCNC: 12 U/L (ref 10–40)
BASOPHILS # BLD AUTO: 0.03 K/UL (ref 0–0.2)
BASOPHILS # BLD AUTO: 0.04 K/UL (ref 0–0.2)
BASOPHILS NFR BLD: 0.5 % (ref 0–1.9)
BASOPHILS NFR BLD: 0.8 % (ref 0–1.9)
BILIRUB SERPL-MCNC: 0.4 MG/DL (ref 0.1–1)
BUN SERPL-MCNC: 15 MG/DL (ref 6–20)
CALCIUM SERPL-MCNC: 8.2 MG/DL (ref 8.7–10.5)
CHLORIDE SERPL-SCNC: 105 MMOL/L (ref 95–110)
CO2 SERPL-SCNC: 24 MMOL/L (ref 23–29)
CREAT SERPL-MCNC: 0.6 MG/DL (ref 0.5–1.4)
DIFFERENTIAL METHOD: ABNORMAL
DIFFERENTIAL METHOD: ABNORMAL
EOSINOPHIL # BLD AUTO: 0.1 K/UL (ref 0–0.5)
EOSINOPHIL # BLD AUTO: 0.1 K/UL (ref 0–0.5)
EOSINOPHIL NFR BLD: 2 % (ref 0–8)
EOSINOPHIL NFR BLD: 2.3 % (ref 0–8)
ERYTHROCYTE [DISTWIDTH] IN BLOOD BY AUTOMATED COUNT: 14.3 % (ref 11.5–14.5)
ERYTHROCYTE [DISTWIDTH] IN BLOOD BY AUTOMATED COUNT: 14.3 % (ref 11.5–14.5)
EST. GFR  (AFRICAN AMERICAN): >60 ML/MIN/1.73 M^2
EST. GFR  (NON AFRICAN AMERICAN): >60 ML/MIN/1.73 M^2
GLUCOSE SERPL-MCNC: 88 MG/DL (ref 70–110)
HCT VFR BLD AUTO: 24.7 % (ref 37–48.5)
HCT VFR BLD AUTO: 25.4 % (ref 37–48.5)
HGB BLD-MCNC: 8.2 G/DL (ref 12–16)
HGB BLD-MCNC: 8.3 G/DL (ref 12–16)
IMM GRANULOCYTES # BLD AUTO: 0.02 K/UL (ref 0–0.04)
IMM GRANULOCYTES # BLD AUTO: 0.03 K/UL (ref 0–0.04)
IMM GRANULOCYTES NFR BLD AUTO: 0.4 % (ref 0–0.5)
IMM GRANULOCYTES NFR BLD AUTO: 0.5 % (ref 0–0.5)
LYMPHOCYTES # BLD AUTO: 1.2 K/UL (ref 1–4.8)
LYMPHOCYTES # BLD AUTO: 1.5 K/UL (ref 1–4.8)
LYMPHOCYTES NFR BLD: 22 % (ref 18–48)
LYMPHOCYTES NFR BLD: 25 % (ref 18–48)
MCH RBC QN AUTO: 27.9 PG (ref 27–31)
MCH RBC QN AUTO: 28.2 PG (ref 27–31)
MCHC RBC AUTO-ENTMCNC: 32.7 G/DL (ref 32–36)
MCHC RBC AUTO-ENTMCNC: 33.2 G/DL (ref 32–36)
MCV RBC AUTO: 85 FL (ref 82–98)
MCV RBC AUTO: 85 FL (ref 82–98)
MONOCYTES # BLD AUTO: 0.4 K/UL (ref 0.3–1)
MONOCYTES # BLD AUTO: 0.4 K/UL (ref 0.3–1)
MONOCYTES NFR BLD: 6 % (ref 4–15)
MONOCYTES NFR BLD: 6.8 % (ref 4–15)
NEUTROPHILS # BLD AUTO: 3.6 K/UL (ref 1.8–7.7)
NEUTROPHILS # BLD AUTO: 4 K/UL (ref 1.8–7.7)
NEUTROPHILS NFR BLD: 66 % (ref 38–73)
NEUTROPHILS NFR BLD: 67.7 % (ref 38–73)
NRBC BLD-RTO: 0 /100 WBC
NRBC BLD-RTO: 0 /100 WBC
PLATELET # BLD AUTO: 423 K/UL (ref 150–450)
PLATELET # BLD AUTO: 457 K/UL (ref 150–450)
PMV BLD AUTO: 9.5 FL (ref 9.2–12.9)
PMV BLD AUTO: 9.7 FL (ref 9.2–12.9)
POCT GLUCOSE: 101 MG/DL (ref 70–110)
POTASSIUM SERPL-SCNC: 3.6 MMOL/L (ref 3.5–5.1)
PROT SERPL-MCNC: 5.5 G/DL (ref 6–8.4)
RBC # BLD AUTO: 2.91 M/UL (ref 4–5.4)
RBC # BLD AUTO: 2.98 M/UL (ref 4–5.4)
SODIUM SERPL-SCNC: 139 MMOL/L (ref 136–145)
WBC # BLD AUTO: 5.27 K/UL (ref 3.9–12.7)
WBC # BLD AUTO: 6.05 K/UL (ref 3.9–12.7)

## 2022-06-19 PROCEDURE — 11000001 HC ACUTE MED/SURG PRIVATE ROOM

## 2022-06-19 PROCEDURE — 36415 COLL VENOUS BLD VENIPUNCTURE: CPT | Performed by: STUDENT IN AN ORGANIZED HEALTH CARE EDUCATION/TRAINING PROGRAM

## 2022-06-19 PROCEDURE — 85025 COMPLETE CBC W/AUTO DIFF WBC: CPT | Performed by: STUDENT IN AN ORGANIZED HEALTH CARE EDUCATION/TRAINING PROGRAM

## 2022-06-19 PROCEDURE — 80053 COMPREHEN METABOLIC PANEL: CPT | Performed by: STUDENT IN AN ORGANIZED HEALTH CARE EDUCATION/TRAINING PROGRAM

## 2022-06-19 PROCEDURE — 25000003 PHARM REV CODE 250: Performed by: STUDENT IN AN ORGANIZED HEALTH CARE EDUCATION/TRAINING PROGRAM

## 2022-06-19 PROCEDURE — 63600175 PHARM REV CODE 636 W HCPCS: Performed by: STUDENT IN AN ORGANIZED HEALTH CARE EDUCATION/TRAINING PROGRAM

## 2022-06-19 PROCEDURE — C9113 INJ PANTOPRAZOLE SODIUM, VIA: HCPCS | Performed by: STUDENT IN AN ORGANIZED HEALTH CARE EDUCATION/TRAINING PROGRAM

## 2022-06-19 PROCEDURE — 94760 N-INVAS EAR/PLS OXIMETRY 1: CPT

## 2022-06-19 PROCEDURE — 99900035 HC TECH TIME PER 15 MIN (STAT)

## 2022-06-19 PROCEDURE — 94761 N-INVAS EAR/PLS OXIMETRY MLT: CPT

## 2022-06-19 RX ORDER — CALCIUM CARBONATE 200(500)MG
500 TABLET,CHEWABLE ORAL 2 TIMES DAILY
Status: DISCONTINUED | OUTPATIENT
Start: 2022-06-19 | End: 2022-06-21 | Stop reason: HOSPADM

## 2022-06-19 RX ADMIN — PANTOPRAZOLE SODIUM 40 MG: 40 INJECTION, POWDER, FOR SOLUTION INTRAVENOUS at 08:06

## 2022-06-19 RX ADMIN — ACETAMINOPHEN 325 MG: 325 TABLET ORAL at 09:06

## 2022-06-19 RX ADMIN — MUPIROCIN: 20 OINTMENT TOPICAL at 08:06

## 2022-06-19 RX ADMIN — CALCIUM CARBONATE (ANTACID) CHEW TAB 500 MG 500 MG: 500 CHEW TAB at 09:06

## 2022-06-19 RX ADMIN — PANTOPRAZOLE SODIUM 40 MG: 40 INJECTION, POWDER, FOR SOLUTION INTRAVENOUS at 09:06

## 2022-06-19 RX ADMIN — CALCIUM CARBONATE (ANTACID) CHEW TAB 500 MG 500 MG: 500 CHEW TAB at 08:06

## 2022-06-19 RX ADMIN — IRON SUCROSE 500 MG: 20 INJECTION, SOLUTION INTRAVENOUS at 09:06

## 2022-06-19 NOTE — PLAN OF CARE
VN Note: labs, notes, orders, care plan review, will be available as needed.   Problem: Adult Inpatient Plan of Care  Goal: Plan of Care Review  Outcome: Ongoing, Progressing

## 2022-06-19 NOTE — PLAN OF CARE
Pt vitals were maintained, pt tolerated diet well without any c/o n/v. Pt ambulates with standby asst, pt had no BM during the night. Pt tolerated iv iron, pt is Aaox4. Pt recent h/h 8.2/24.7. [facility]   Problem: Adult Inpatient Plan of Care  Goal: Patient-Specific Goal (Individualized)  Outcome: Ongoing, Progressing  Goal: Optimal Comfort and Wellbeing  Outcome: Ongoing, Progressing     Problem: Bleeding (Gastrointestinal Bleeding)  Goal: Hemostasis  Outcome: Ongoing, Progressing     Problem: Pain Acute  Goal: Acceptable Pain Control and Functional Ability  Outcome: Ongoing, Progressing

## 2022-06-19 NOTE — PROGRESS NOTES
"[facility]    CC: Coffee-ground emesis     Subjective:  No acute events overnight. Patient denies any recurrent emesis. She also denies abdominal pain or nausea. She would like to try to eat today.     Past Medical History  Past Medical History:   Diagnosis Date    Anemia     Hypothyroidism      Review of Systems  General ROS: Negative for chills, fever or weight loss  Gastrointestinal ROS: Negative for coffee-ground emesis, nausea, and abdominal pain; no change in bowel habits or black/ bloody stools    Physical Examination  /70 (BP Location: Right arm, Patient Position: Lying)   Pulse 76   Temp 98.4 °F (36.9 °C) (Oral)   Resp 17   Ht 5' 3" (1.6 m)   Wt 66.5 kg (146 lb 9.7 oz)   LMP 06/29/2016   SpO2 (!) 89%   Breastfeeding No   BMI 25.97 kg/m²   General appearance: alert, cooperative, no distress  Abdomen: soft, non-tender; bowel sounds normoactive; no organomegaly    Prior Endoscopy:  EGD 6/18/22  - Normal esophagus.   - Erythematous mucosa in the antrum with superficial erosions.   - Non-bleeding duodenal ulcer with a clean ulcer base (Frank Class III).   - Non-bleeding duodenal ulcer with a nonbleeding visible vessel (Frank Class IIa).  Treated with bipolar cautery and hemostatic clips x2.  hemostatic spray applied for hemostasis with no bleeding on completion of the procedure.     Assessment:   51 y.o. female with history of migraine headaches who presented presented with coffee-ground emesis with associated decreased hemoglobin from baseline and was found to have cratered ulcers in the duodenal bulb (one ulcer with visible vessel treated with bipolar electrocautery, hemostatic clips and hemostatic spray). No further blood loss overnight. Hemoglobin stable.     Plan:   - Continued iron supplementation per primary team   - Continue present medications including IV PPI BID for 72 hours total. After 72 hours, transition to oral PPI BID for 8 weeks.   - Advance diet as tolerated  - " Avoid all NSAID medications. Tylenol as needed for headaches (up to 2g daily).  - Outpatient follow-up with GI to schedule colonoscopy for screening purposes    Case discussed with Dr. Evangelista.     Davy Palacios MD  [facility] Fellow, PGY-4

## 2022-06-19 NOTE — PROGRESS NOTES
[facility] Medicine  Progress Note    Patient Name: Lila Gee  MRN: [id number]  Patient Class: IP- Inpatient   Admission Date: 6/18/2022  Length of Stay: 1 days  Attending Physician: Alexandra Guadarrama MD  Primary Care Provider: Primary Doctor No    Subjective:     Principal Problem:Upper GI bleed    HPI:  Patient is a 51 year old female with Pmhx of headaches and thrombocytosis presenting to OSH for same day onset of abdominal pain with emesis. Patient reports she was at work when she developed quick onset of nausea, coffee ground emesis and diaphoresis. Patient immediately wheeled to the ED and workup included CT head without contrast that was negative. Occult blood positive. Patients Hgb 9.4/29.9; NGT placed and noted dark ground return with BR blood. Patient typed and screened. 2 bore iv's obtained and PPI IV started. Patient transferred to [facility] for ICU admission for upper GI bleed.     Patient denies any previous episodes of hemoptysis or GI bleeds. Patient reports over 10 years ago of BC goodys powder use. Patient denies any use currently. Patient reports previously worked up for bleeding, with no source noted or discovered. Patient discharged on iron supplements. Patient reports following with PCP and informed to stop taking po Iron supplements. Patient reports previously followed by heme/onc for thrombocytosis. Patient denies any alcohol use, denies any tobacco use or drug use. Patient denies any NSAID use as well.         Past Medical History:   Diagnosis Date    Anemia     Hypothyroidism        Past Surgical History:   Procedure Laterality Date    SINUS SURGERY         Review of patient's allergies indicates:  No Known Allergies    No current facility-administered medications on file prior to encounter.     No current outpatient medications on file prior to encounter.     Family History       Problem Relation (Age of Onset)    Diabetes Brother    Fibromyalgia Mother, Sister    Irritable  bowel syndrome Mother          Tobacco Use    Smoking status: Never Smoker    Smokeless tobacco: Never Used   Substance and Sexual Activity    Alcohol use: No    Drug use: No    Sexual activity: Not Currently     Partners: Male     Birth control/protection: Condom     Review of Systems   Constitutional:  Negative for chills and fever.   HENT:  Negative for ear pain and sore throat.    Eyes:  Negative for discharge and redness.   Respiratory:  Negative for cough and chest tightness.    Cardiovascular:  Negative for chest pain and leg swelling.   Gastrointestinal:  Positive for abdominal pain, blood in stool, nausea and vomiting. Negative for diarrhea.   Genitourinary:  Negative for dysuria and hematuria.   Musculoskeletal:  Negative for back pain and myalgias.   Skin:  Negative for pallor and rash.   Neurological:  Negative for dizziness and headaches.   Psychiatric/Behavioral:  Negative for confusion. The patient is not nervous/anxious.    Objective:     Vital Signs (Most Recent):  Temp: 98.4 °F (36.9 °C) (06/19/22 0429)  Pulse: 76 (06/19/22 0429)  Resp: 17 (06/19/22 0429)  BP: 103/70 (06/19/22 0429)  SpO2: 96 % (06/19/22 0824) Vital Signs (24h Range):  Temp:  [97.6 °F (36.4 °C)-98.9 °F (37.2 °C)] 98.4 °F (36.9 °C)  Pulse:  [76-96] 76  Resp:  [9-20] 17  SpO2:  [89 %-100 %] 96 %  BP: (103-116)/(62-79) 103/70     Weight: 66.5 kg (146 lb 9.7 oz)  Body mass index is 25.97 kg/m².    Physical Exam  Vitals reviewed.   Constitutional:       General: She is not in acute distress.     Appearance: Normal appearance.   HENT:      Head: Normocephalic and atraumatic.   Eyes:      General:         Right eye: No discharge.         Left eye: No discharge.      Extraocular Movements: Extraocular movements intact.      Pupils: Pupils are equal, round, and reactive to light.   Cardiovascular:      Rate and Rhythm: Normal rate and regular rhythm.      Pulses: Normal pulses.      Heart sounds: No murmur heard.  Pulmonary:       Effort: Pulmonary effort is normal. No respiratory distress.      Breath sounds: Normal breath sounds. No wheezing.   Abdominal:      General: Abdomen is flat. Bowel sounds are normal.      Palpations: Abdomen is soft.      Tenderness: There is no abdominal tenderness.   Musculoskeletal:         General: No swelling or tenderness.   Skin:     Capillary Refill: Capillary refill takes less than 2 seconds.      Coloration: Skin is not jaundiced.   Neurological:      General: No focal deficit present.      Mental Status: She is alert and oriented to person, place, and time.   Psychiatric:         Mood and Affect: Mood normal.         Behavior: Behavior normal.         CRANIAL NERVES     CN III, IV, VI   Pupils are equal, round, and reactive to light.     Significant Labs: All pertinent labs within the past 24 hours have been reviewed.  A1C: No results for input(s): HGBA1C in the last 4320 hours.  CBC:   Recent Labs   Lab 06/18/22  1832 06/18/22  2354 06/19/22  0547   WBC 8.38 6.05 5.27   HGB 9.0* 8.2* 8.3*   HCT 29.0* 24.7* 25.4*   * 457* 423     CMP:   Recent Labs   Lab 06/17/22  1628 06/18/22  0536 06/19/22  0210    142 139   K 3.6 3.4* 3.6    109 105   CO2 23 23 24   * 102 88   BUN 45* 35* 15   CREATININE 0.7 0.6 0.6   CALCIUM 7.7* 8.0* 8.2*   PROT 5.7* 5.6* 5.5*   ALBUMIN 3.2* 3.1* 3.2*   BILITOT 0.3 0.4 0.4   ALKPHOS 50* 51* 49*   AST 12 12 12   ALT 12 9* 9*   ANIONGAP 11 10 10   EGFRNONAA >60.0 >60 >60     Lipase:   Recent Labs   Lab 06/17/22  1628   LIPASE 21     Magnesium: No results for input(s): MG in the last 48 hours.  TSH:   Recent Labs   Lab 06/17/22  1628   TSH 3.990       Significant Imaging: I have reviewed all pertinent imaging results/findings within the past 24 hours.      Assessment/Plan:      * Upper GI bleed  Onset prior to arrival  Associated with diaphoresis, coffee ground emesis with BRB emesis  In ED, NGT placed. Hg trended 9.4>10.4>10.1  Repeat Hgb 8.8 >  8.3  Maintain 2 bore ivs  Trend H/H q 6hrs  Maintain active type and screen    GI consulted  EGD performed 6/18 with findings below:  - Normal esophagus.   - Erythematous mucosa in the antrum with superficial erosions.   - Non-bleeding duodenal ulcer with a clean ulcer base (Frank Class III).   - Non-bleeding duodenal ulcer with a nonbleeding visible vessel (Frank Class IIa).  Treated with bipolar cautery and hemostatic clips x2.  hemostatic spray applied for hemostasis with no bleeding on completion of the procedure.      - IV Venofer 500mg day 2 of 2  - IV PPI BID for 72 hours total. After 72 hours, transition to oral PPI BID for 8 weeks.   - Advance diet as tolerated  - Avoid all NSAID medications. Tylenol as needed for headaches (up to 2g daily).    Gastritis and duodenitis        Coffee ground emesis  Resolved        VTE Risk Mitigation (From admission, onward)         Ordered     IP VTE LOW RISK PATIENT  Once         06/18/22 0253     Place sequential compression device  Until discontinued         06/18/22 0253                Discharge Planning   KENISHA:      Code Status: Full Code   Is the patient medically ready for discharge?:     Reason for patient still in hospital (select all that apply): Patient trending condition, Treatment and Consult recommendations           Sabrina Starks MD, [facility]  [employer] [facility] PGY2  Department of [facility] Medicine   [facility] Surg

## 2022-06-19 NOTE — ANESTHESIA POSTPROCEDURE EVALUATION
Anesthesia Post Evaluation    Patient: Lila Gee    Procedure(s) Performed: Procedure(s) (LRB):  EGD (ESOPHAGOGASTRODUODENOSCOPY) (N/A)    Final Anesthesia Type: MAC      Patient location during evaluation: ICU  Patient participation: Yes- Able to Participate  Level of consciousness: awake and alert and oriented  Post-procedure vital signs: reviewed and stable  Pain management: adequate  Airway patency: patent    PONV status at discharge: No PONV  Anesthetic complications: no      Cardiovascular status: blood pressure returned to baseline  Respiratory status: unassisted  Hydration status: euvolemic  Follow-up not needed.          Vitals Value Taken Time   /72 06/18/22 1915   Temp 36.8 °C (98.2 °F) 06/18/22 1915   Pulse 96 06/18/22 1915   Resp 20 06/18/22 1915   SpO2 96 % 06/18/22 1947         No case tracking events are documented in the log.      Pain/Chapin Score: Pain Rating Prior to Med Admin: 6 (6/18/2022  4:43 PM)

## 2022-06-20 LAB
ALBUMIN SERPL BCP-MCNC: 3.4 G/DL (ref 3.5–5.2)
ALP SERPL-CCNC: 52 U/L (ref 55–135)
ALT SERPL W/O P-5'-P-CCNC: 10 U/L (ref 10–44)
ANION GAP SERPL CALC-SCNC: 11 MMOL/L (ref 8–16)
AST SERPL-CCNC: 12 U/L (ref 10–40)
BASOPHILS # BLD AUTO: 0.02 K/UL (ref 0–0.2)
BASOPHILS NFR BLD: 0.4 % (ref 0–1.9)
BILIRUB SERPL-MCNC: 0.2 MG/DL (ref 0.1–1)
BUN SERPL-MCNC: 9 MG/DL (ref 6–20)
CALCIUM SERPL-MCNC: 8.4 MG/DL (ref 8.7–10.5)
CHLORIDE SERPL-SCNC: 106 MMOL/L (ref 95–110)
CO2 SERPL-SCNC: 25 MMOL/L (ref 23–29)
CREAT SERPL-MCNC: 0.6 MG/DL (ref 0.5–1.4)
DIFFERENTIAL METHOD: ABNORMAL
EOSINOPHIL # BLD AUTO: 0.1 K/UL (ref 0–0.5)
EOSINOPHIL NFR BLD: 3 % (ref 0–8)
ERYTHROCYTE [DISTWIDTH] IN BLOOD BY AUTOMATED COUNT: 14.6 % (ref 11.5–14.5)
EST. GFR  (AFRICAN AMERICAN): >60 ML/MIN/1.73 M^2
EST. GFR  (NON AFRICAN AMERICAN): >60 ML/MIN/1.73 M^2
ESTIMATED AVG GLUCOSE: 126 MG/DL (ref 68–131)
GLUCOSE SERPL-MCNC: 91 MG/DL (ref 70–110)
HBA1C MFR BLD: 6 % (ref 4–5.6)
HCT VFR BLD AUTO: 25.7 % (ref 37–48.5)
HGB BLD-MCNC: 8.3 G/DL (ref 12–16)
IMM GRANULOCYTES # BLD AUTO: 0.01 K/UL (ref 0–0.04)
IMM GRANULOCYTES NFR BLD AUTO: 0.2 % (ref 0–0.5)
LYMPHOCYTES # BLD AUTO: 1.1 K/UL (ref 1–4.8)
LYMPHOCYTES NFR BLD: 24 % (ref 18–48)
MAGNESIUM SERPL-MCNC: 2 MG/DL (ref 1.6–2.6)
MCH RBC QN AUTO: 28.1 PG (ref 27–31)
MCHC RBC AUTO-ENTMCNC: 32.3 G/DL (ref 32–36)
MCV RBC AUTO: 87 FL (ref 82–98)
MONOCYTES # BLD AUTO: 0.4 K/UL (ref 0.3–1)
MONOCYTES NFR BLD: 8.7 % (ref 4–15)
NEUTROPHILS # BLD AUTO: 2.9 K/UL (ref 1.8–7.7)
NEUTROPHILS NFR BLD: 63.7 % (ref 38–73)
NRBC BLD-RTO: 0 /100 WBC
PHOSPHATE SERPL-MCNC: 3.4 MG/DL (ref 2.7–4.5)
PLATELET # BLD AUTO: 413 K/UL (ref 150–450)
PMV BLD AUTO: 9.8 FL (ref 9.2–12.9)
POTASSIUM SERPL-SCNC: 4.3 MMOL/L (ref 3.5–5.1)
PROT SERPL-MCNC: 5.7 G/DL (ref 6–8.4)
RBC # BLD AUTO: 2.95 M/UL (ref 4–5.4)
SODIUM SERPL-SCNC: 142 MMOL/L (ref 136–145)
WBC # BLD AUTO: 4.62 K/UL (ref 3.9–12.7)

## 2022-06-20 PROCEDURE — 11000001 HC ACUTE MED/SURG PRIVATE ROOM

## 2022-06-20 PROCEDURE — 25000003 PHARM REV CODE 250: Performed by: STUDENT IN AN ORGANIZED HEALTH CARE EDUCATION/TRAINING PROGRAM

## 2022-06-20 PROCEDURE — 83036 HEMOGLOBIN GLYCOSYLATED A1C: CPT | Performed by: STUDENT IN AN ORGANIZED HEALTH CARE EDUCATION/TRAINING PROGRAM

## 2022-06-20 PROCEDURE — 94761 N-INVAS EAR/PLS OXIMETRY MLT: CPT

## 2022-06-20 PROCEDURE — 84100 ASSAY OF PHOSPHORUS: CPT | Performed by: STUDENT IN AN ORGANIZED HEALTH CARE EDUCATION/TRAINING PROGRAM

## 2022-06-20 PROCEDURE — C9113 INJ PANTOPRAZOLE SODIUM, VIA: HCPCS | Performed by: STUDENT IN AN ORGANIZED HEALTH CARE EDUCATION/TRAINING PROGRAM

## 2022-06-20 PROCEDURE — 99900035 HC TECH TIME PER 15 MIN (STAT)

## 2022-06-20 PROCEDURE — 85025 COMPLETE CBC W/AUTO DIFF WBC: CPT | Performed by: STUDENT IN AN ORGANIZED HEALTH CARE EDUCATION/TRAINING PROGRAM

## 2022-06-20 PROCEDURE — 36415 COLL VENOUS BLD VENIPUNCTURE: CPT | Performed by: STUDENT IN AN ORGANIZED HEALTH CARE EDUCATION/TRAINING PROGRAM

## 2022-06-20 PROCEDURE — 63600175 PHARM REV CODE 636 W HCPCS: Performed by: STUDENT IN AN ORGANIZED HEALTH CARE EDUCATION/TRAINING PROGRAM

## 2022-06-20 PROCEDURE — 83735 ASSAY OF MAGNESIUM: CPT | Performed by: STUDENT IN AN ORGANIZED HEALTH CARE EDUCATION/TRAINING PROGRAM

## 2022-06-20 PROCEDURE — 25000003 PHARM REV CODE 250

## 2022-06-20 PROCEDURE — 80053 COMPREHEN METABOLIC PANEL: CPT | Performed by: STUDENT IN AN ORGANIZED HEALTH CARE EDUCATION/TRAINING PROGRAM

## 2022-06-20 PROCEDURE — 94760 N-INVAS EAR/PLS OXIMETRY 1: CPT

## 2022-06-20 RX ORDER — SODIUM CHLORIDE 9 MG/ML
INJECTION, SOLUTION INTRAVENOUS CONTINUOUS
Status: DISCONTINUED | OUTPATIENT
Start: 2022-06-20 | End: 2022-06-20

## 2022-06-20 RX ORDER — SODIUM CHLORIDE 9 MG/ML
INJECTION, SOLUTION INTRAVENOUS CONTINUOUS
Status: ACTIVE | OUTPATIENT
Start: 2022-06-20 | End: 2022-06-20

## 2022-06-20 RX ORDER — SODIUM CHLORIDE 9 MG/ML
INJECTION, SOLUTION INTRAVENOUS CONTINUOUS
Status: ACTIVE | OUTPATIENT
Start: 2022-06-20 | End: 2022-06-21

## 2022-06-20 RX ADMIN — ACETAMINOPHEN 650 MG: 325 TABLET ORAL at 07:06

## 2022-06-20 RX ADMIN — MUPIROCIN: 20 OINTMENT TOPICAL at 09:06

## 2022-06-20 RX ADMIN — PANTOPRAZOLE SODIUM 40 MG: 40 INJECTION, POWDER, FOR SOLUTION INTRAVENOUS at 08:06

## 2022-06-20 RX ADMIN — ACETAMINOPHEN 650 MG: 325 TABLET ORAL at 09:06

## 2022-06-20 RX ADMIN — CALCIUM CARBONATE (ANTACID) CHEW TAB 500 MG 500 MG: 500 CHEW TAB at 09:06

## 2022-06-20 RX ADMIN — SODIUM CHLORIDE: 0.9 INJECTION, SOLUTION INTRAVENOUS at 05:06

## 2022-06-20 RX ADMIN — ACETAMINOPHEN 650 MG: 325 TABLET ORAL at 11:06

## 2022-06-20 RX ADMIN — PANTOPRAZOLE SODIUM 40 MG: 40 INJECTION, POWDER, FOR SOLUTION INTRAVENOUS at 09:06

## 2022-06-20 RX ADMIN — SODIUM CHLORIDE: 0.9 INJECTION, SOLUTION INTRAVENOUS at 10:06

## 2022-06-20 NOTE — PLAN OF CARE
AAOX4. Regular diet continued. Pt denies N/V, SOB, distress, and pain. C/o intermittent nausea. Tele monitored. Medications given per MAR. No BM's through the night.     Vital signs stable throughout the night, slightly low BP. Afebrile.  Safety precautions maintained. Bed alarm set. Call bell within reach. Patient encouraged to call for assistance.

## 2022-06-20 NOTE — [employer] PATIENT INSTRUCTIONS
Discharge Summary/Instructions after an Endoscopic Procedure  Patient Name: Lila Gee  Patient MRN: [id number]  Patient YOB: 1970 Saturday, June 18, 2022  Nilesh Evangelista MD  Dear patient,  As a result of recent federal legislation (The Federal Cures Act), you may   receive lab or pathology results from your procedure in your [facility]   account before your physician is able to contact you. Your physician or   their representative will relay the results to you with their   recommendations at their soonest availability.  Thank you,  Your health is very important to us during the Covid Crisis. Following your   procedure today, you will receive a daily text for 2 weeks asking about   signs or symptoms of Covid 19.  Please respond to this text when you   receive it so we can follow up and keep you as safe as possible.   RESTRICTIONS:  During your procedure today, you received medications for sedation.  These   medications may affect your judgment, balance and coordination.  Therefore,   for 24 hours, you have the following restrictions:   - DO NOT drive a car, operate machinery, make legal/financial decisions,   sign important papers or drink alcohol.    ACTIVITY:  Today: no heavy lifting, straining or running due to procedural   sedation/anesthesia.  The following day: return to full activity including work.  DIET:  Eat and drink normally unless instructed otherwise.     TREATMENT FOR COMMON SIDE EFFECTS:  - Mild abdominal pain, nausea, belching, bloating or excessive gas:  rest,   eat lightly and use a heating pad.  - Sore Throat: treat with throat lozenges and/or gargle with warm salt   water.  - Because air was used during the procedure, expelling large amounts of air   from your rectum or belching is normal.  - If a bowel prep was taken, you may not have a bowel movement for 1-3 days.    This is normal.  SYMPTOMS TO WATCH FOR AND REPORT TO YOUR PHYSICIAN:  1. Abdominal pain or bloating, other than gas  cramps.  2. Chest pain.  3. Back pain.  4. Signs of infection such as: chills or fever occurring within 24 hours   after the procedure.  5. Rectal bleeding, which would show as bright red, maroon, or black stools.   (A tablespoon of blood from the rectum is not serious, especially if   hemorrhoids are present.)  6. Vomiting.  7. Weakness or dizziness.  GO DIRECTLY TO THE NEAREST EMERGENCY ROOM IF YOU HAVE ANY OF THE FOLLOWING:      Difficulty breathing              Chills and/or fever over 101 F   Persistent vomiting and/or vomiting blood   Severe abdominal pain   Severe chest pain   Black, tarry stools   Bleeding- more than one tablespoon   Any other symptom or condition that you feel may need urgent attention  Your doctor recommends these additional instructions:  If any biopsies were taken, your doctors clinic will contact you in 1 to 2   weeks with any results.  - Return patient to hospital [facility] for ongoing care.   - NPO for 4 hours, then advance to clear liquid diet today, then advance as   tolerated tomorrow.  - IV PPI BID for 72 hours total. After 72 hours, transition to oral PPI for   8 weeks.   - Avoid all NSAID medications. Tylenol as needed for headaches (up to 2g   from all sources daily).  - AM CBC. Will follow up tomorrow. Remainder as outlined per inpatient   notes.  For questions, problems or results please call your physician - Nilesh Evangelista MD.  EMERGENCY PHONE NUMBER: [phone],  LAB RESULTS: [phone]  IF A COMPLICATION OR EMERGENCY SITUATION ARISES AND YOU ARE UNABLE TO REACH   YOUR PHYSICIAN - GO DIRECTLY TO THE EMERGENCY ROOM.  Nilesh Evangelista MD  6/18/2022 12:07:37 PM  This report has been verified and signed electronically.  Dear patient,  As a result of recent federal legislation (The Federal Cures Act), you may   receive lab or pathology results from your procedure in your [facility]   account before your physician is able to contact you. Your physician or   their representative  will relay the results to you with their   recommendations at their soonest availability.  Thank you,  [employer]ATION

## 2022-06-20 NOTE — HOSPITAL COURSE
She was admitted on 6/18 to the [facility] team. Upon admission, IV Protonix was administered for 72 hours. She was also given an 2 iron sucrose infusions. An EGD was performed showing 2 non-bleeding ulcers. Patient is being discharged home in good condition. She is sent home with oral Protonix for 8 weeks and strong recommendations to avoid NSAIDs.

## 2022-06-20 NOTE — PLAN OF CARE
went to meet with patient. Patient reports she is independent at home. She lives with her daughter and grandchildren at home. She does not have any DME or Home Health. Patient still drives, but family will transport home at discharge. She does not have a PCP. Patient is agreeable to [facility] follow-up at discharge. She is also agreeable to GI follow-up here at [facility].  will request appointments from access navigators. Patient encouraged to call with any questions or concerns.  will continue to follow patient through transitions of care and assist with any discharge needs.     Patient Contacts    Name Relation Home Work Mobile   NORMA ORNELAS [phone]  [phone]        06/20/22 1154   Discharge Assessment   Assessment Type Discharge Planning Assessment   Confirmed/corrected address, phone number and insurance Yes   Confirmed Demographics Correct on Facesheet   Source of Information patient   Lives With child(kaden), adult;grandchild(kaden)   Do you expect to return to your current living situation? Yes   Do you have help at home or someone to help you manage your care at home? Yes   Prior to hospitilization cognitive status: Alert/Oriented   Current cognitive status: Alert/Oriented   Walking or Climbing Stairs Difficulty none   Dressing/Bathing Difficulty none   Equipment Currently Used at Home none   Do you take prescription medications? Yes   Do you have prescription coverage? Yes   Do you have any problems affording any of your prescribed medications? No   Is the patient taking medications as prescribed? yes   Who is going to help you get home at discharge? Stephen   How do you get to doctors appointments? car, drives self;family or friend will provide   Are you on dialysis? No   Do you take coumadin? No   Discharge Plan A Home   Discharge Plan B Home with family   DME Needed Upon Discharge  none   Discharge Plan discussed with: Patient   Discharge  Barriers Identified None     Linda Monsivais RN    [phone]

## 2022-06-20 NOTE — PROGRESS NOTES
[facility] Medicine  Progress Note    Patient Name: Lila Gee  MRN: [id number]  Patient Class: IP- Inpatient   Admission Date: 6/18/2022  Length of Stay: 2 days  Attending Physician: Alexandra Guadarrama MD  Primary Care Provider: Primary Doctor No    Subjective:     Principal Problem:Upper GI bleed    HPI:  Patient is a 51 year old female with Pmhx of headaches and thrombocytosis presenting to OSH for same day onset of abdominal pain with emesis. Patient reports she was at work when she developed quick onset of nausea, coffee ground emesis and diaphoresis. Patient immediately wheeled to the ED and workup included CT head without contrast that was negative. Occult blood positive. Patients Hgb 9.4/29.9; NGT placed and noted dark ground return with BR blood. Patient typed and screened. 2 bore iv's obtained and PPI IV started. [place] [facility] [place].     Patient denies any previous episodes of hemoptysis or GI bleeds. Patient reports over 10 years ago of BC goodys powder use. Patient denies any use currently. Patient reports previously worked up for bleeding, with no source noted or discovered. Patient discharged on iron supplements. Patient reports following with PCP and informed to stop taking po Iron supplements. Patient reports previously followed by heme/onc for thrombocytosis. Patient denies any alcohol use, denies any tobacco use or drug use. Patient denies any NSAID use as well.       Interval History: NAEO. Slept poorly. Tolerating diet.     Review of Systems   Constitutional:  Negative for chills and fever.   HENT:  Negative for ear pain and sore throat.    Eyes:  Negative for discharge and redness.   Respiratory:  Negative for cough and chest tightness.    Cardiovascular:  Negative for chest pain and leg swelling.   Gastrointestinal:  Positive for nausea. Negative for abdominal pain, blood in stool, diarrhea and vomiting.   Genitourinary:  Negative for  dysuria and hematuria.   Musculoskeletal:  Negative for back pain and myalgias.   Skin:  Negative for pallor and rash.   Neurological:  Positive for light-headedness. Negative for dizziness and headaches.   Psychiatric/Behavioral:  Negative for confusion. The patient is not nervous/anxious.    Objective:     Vital Signs (Most Recent):  Temp: 99.6 °F (37.6 °C) (06/20/22 0444)  Pulse: 75 (06/20/22 0444)  Resp: 18 (06/20/22 0444)  BP: 90/60 (06/20/22 0450)  SpO2: 97 % (06/20/22 0444) Vital Signs (24h Range):  Temp:  [98 °F (36.7 °C)-99.6 °F (37.6 °C)] 99.6 °F (37.6 °C)  Pulse:  [75-95] 75  Resp:  [18] 18  SpO2:  [96 %-97 %] 97 %  BP: ()/(54-70) 90/60     Weight: 66.5 kg (146 lb 9.7 oz)  Body mass index is 25.97 kg/m².    Intake/Output Summary (Last 24 hours) at 6/20/2022 0755  Last data filed at 6/19/2022 1800  Gross per 24 hour   Intake 600 ml   Output --   Net 600 ml      Physical Exam  Vitals reviewed.   Constitutional:       General: She is not in acute distress.     Appearance: Normal appearance.   HENT:      Head: Normocephalic and atraumatic.   Eyes:      General:         Right eye: No discharge.         Left eye: No discharge.      Extraocular Movements: Extraocular movements intact.      Pupils: Pupils are equal, round, and reactive to light.   Cardiovascular:      Rate and Rhythm: Normal rate and regular rhythm.      Pulses: Normal pulses.      Heart sounds: No murmur heard.  Pulmonary:      Effort: Pulmonary effort is normal. No respiratory distress.      Breath sounds: Normal breath sounds. No wheezing.   Abdominal:      General: Abdomen is flat. Bowel sounds are normal.      Palpations: Abdomen is soft.      Tenderness: There is no abdominal tenderness.   Musculoskeletal:         General: No swelling or tenderness.   Skin:     Capillary Refill: Capillary refill takes less than 2 seconds.      Coloration: Skin is not jaundiced.   Neurological:      General: No focal deficit present.      Mental  Status: She is alert and oriented to person, place, and time.   Psychiatric:         Mood and Affect: Mood normal.         Behavior: Behavior normal.       Significant Labs: All pertinent labs within the past 24 hours have been reviewed.  CBC:   Recent Labs   Lab 06/18/22  2354 06/19/22  0547 06/20/22  0501   WBC 6.05 5.27 4.62   HGB 8.2* 8.3* 8.3*   HCT 24.7* 25.4* 25.7*   * 423 413     CMP:   Recent Labs   Lab 06/19/22  0210 06/20/22  0501    142   K 3.6 4.3    106   CO2 24 25   GLU 88 91   BUN 15 9   CREATININE 0.6 0.6   CALCIUM 8.2* 8.4*   PROT 5.5* 5.7*   ALBUMIN 3.2* 3.4*   BILITOT 0.4 0.2   ALKPHOS 49* 52*   AST 12 12   ALT 9* 10   ANIONGAP 10 11   EGFRNONAA >60 >60     POCT Glucose:   Recent Labs   Lab 06/18/22  1225 06/18/22  1647   POCTGLUCOSE 101 82       Significant Imaging: I have reviewed all pertinent imaging results/findings within the past 24 hours.      Assessment/Plan:      * Upper GI bleed  Onset prior to arrival  Associated with diaphoresis, coffee ground emesis with BRB emesis  In ED, NGT placed. Hg trended 9.4>10.4>10.1  Repeat Hgb 8.8 > 8.3  Maintain 2 bore ivs  Trend H/H q 6hrs  Maintain active type and screen    GI consulted  EGD performed 6/18 with findings below:  - Normal esophagus.   - Erythematous mucosa in the antrum with superficial erosions.   - Non-bleeding duodenal ulcer with a clean ulcer base (Frank Class III).   - Non-bleeding duodenal ulcer with a nonbleeding visible vessel (Frank Class IIa).  Treated with bipolar cautery and hemostatic clips x2.  hemostatic spray applied for hemostasis with no bleeding on completion of the procedure.      - IV Venofer 500mg x2 Completed  -Tolerating diet  - IV PPI BID for 72 hours total (6/21 afternoon). After 72 hours, transition to oral PPI BID for 8 weeks.   - Avoid all NSAID medications. Tylenol as needed for headaches (up to 2g daily).    Duodenal ulcer        Gastritis and duodenitis        Coffee ground  emesis  Resolved        VTE Risk Mitigation (From admission, onward)         Ordered     IP VTE LOW RISK PATIENT  Once         06/18/22 0253     Place sequential compression device  Until discontinued         06/18/22 0253                Discharge Planning   KENISHA:      Code Status: Full Code   Is the patient medically ready for discharge?:     Reason for patient still in hospital (select all that apply): Patient trending condition, Treatment and Pending disposition           Sabrina Starks MD  Department of [facility] Medicine   [facility]

## 2022-06-20 NOTE — NURSING
BP 90/60 HR 75. Pt states that she is normally 's. She denies symptoms currently but states that she felt nauseated a few hours ago. MD made aware. WCTM

## 2022-06-20 NOTE — PROGRESS NOTES
"[facility]    CC: Coffee-ground emesis     Subjective:  Patient c/o nausea around 2am,denies any recurrent emesis or abdominal pain. Reports dark stool last night, denies blood. She has been eating full diet since yesterday.     Past Medical History  Past Medical History:   Diagnosis Date    Anemia     Hypothyroidism      Review of Systems  General ROS: Negative for chills, fever or weight loss  Gastrointestinal ROS: Negative for coffee-ground emesis or abd pain, +nausea; +dark stool    Physical Examination  BP 90/60 (BP Location: Right arm, Patient Position: Lying)   Pulse 75   Temp 99.6 °F (37.6 °C)   Resp 18   Ht 5' 3" (1.6 m)   Wt 66.5 kg (146 lb 9.7 oz)   LMP 06/29/2016   SpO2 97%   Breastfeeding No   BMI 25.97 kg/m²   General appearance: alert, cooperative, no distress  Abdomen: soft, non-tender; bowel sounds normoactive; no organomegaly    Prior Endoscopy:  EGD 6/18/22  - Normal esophagus.   - Erythematous mucosa in the antrum with superficial erosions.   - Non-bleeding duodenal ulcer with a clean ulcer base (Frank Class III).   - Non-bleeding duodenal ulcer with a nonbleeding visible vessel (Frank Class IIa).  Treated with bipolar cautery and hemostatic clips x2.  hemostatic spray applied for hemostasis with no bleeding on completion of the procedure.     Assessment:   51 y.o. female with history of migraine headaches who presented presented with coffee-ground emesis with associated decreased hemoglobin from baseline and was found to have cratered ulcers in the duodenal bulb (one ulcer with visible vessel treated with bipolar electrocautery, hemostatic clips and hemostatic spray). No further blood loss overnight. Hemoglobin stable.     Plan:   - Continue iron supplementation per primary team   - Complete IV PPI x 72hrs today, then continue oral PPI BID for 8 weeks.  - Avoid all NSAID medications. Tylenol as needed for headaches (up to 2g daily).  - Outpatient follow-up with GI to " schedule colonoscopy for screening purposes    Case to be discussed with Dr. Gary Velez, DO  [employer] [facility] PGY2

## 2022-06-20 NOTE — ASSESSMENT & PLAN NOTE
Onset prior to arrival  Associated with diaphoresis, coffee ground emesis with BRB emesis  In ED, NGT placed. Hg trended 9.4>10.4>10.1  Repeat Hgb 8.8 > 8.3  Maintain 2 bore ivs  Trend H/H q 6hrs  Maintain active type and screen    GI consulted  EGD performed 6/18 with findings below:  - Normal esophagus.   - Erythematous mucosa in the antrum with superficial erosions.   - Non-bleeding duodenal ulcer with a clean ulcer base (Frank Class III).   - Non-bleeding duodenal ulcer with a nonbleeding visible vessel (Frank Class IIa).  Treated with bipolar cautery and hemostatic clips x2.  hemostatic spray applied for hemostasis with no bleeding on completion of the procedure.      - IV Venofer 500mg x2 Completed  -Tolerating diet  - IV PPI BID for 72 hours total (6/21 afternoon). After 72 hours, transition to oral PPI BID for 8 weeks.   - Avoid all NSAID medications. Tylenol as needed for headaches (up to 2g daily).

## 2022-06-20 NOTE — SUBJECTIVE & OBJECTIVE
Interval History: NAEO. Slept poorly. Tolerating diet.     Review of Systems   Constitutional:  Negative for chills and fever.   HENT:  Negative for ear pain and sore throat.    Eyes:  Negative for discharge and redness.   Respiratory:  Negative for cough and chest tightness.    Cardiovascular:  Negative for chest pain and leg swelling.   Gastrointestinal:  Positive for nausea. Negative for abdominal pain, blood in stool, diarrhea and vomiting.   Genitourinary:  Negative for dysuria and hematuria.   Musculoskeletal:  Negative for back pain and myalgias.   Skin:  Negative for pallor and rash.   Neurological:  Positive for light-headedness. Negative for dizziness and headaches.   Psychiatric/Behavioral:  Negative for confusion. The patient is not nervous/anxious.    Objective:     Vital Signs (Most Recent):  Temp: 99.6 °F (37.6 °C) (06/20/22 0444)  Pulse: 75 (06/20/22 0444)  Resp: 18 (06/20/22 0444)  BP: 90/60 (06/20/22 0450)  SpO2: 97 % (06/20/22 0444) Vital Signs (24h Range):  Temp:  [98 °F (36.7 °C)-99.6 °F (37.6 °C)] 99.6 °F (37.6 °C)  Pulse:  [75-95] 75  Resp:  [18] 18  SpO2:  [96 %-97 %] 97 %  BP: ()/(54-70) 90/60     Weight: 66.5 kg (146 lb 9.7 oz)  Body mass index is 25.97 kg/m².    Intake/Output Summary (Last 24 hours) at 6/20/2022 0755  Last data filed at 6/19/2022 1800  Gross per 24 hour   Intake 600 ml   Output --   Net 600 ml      Physical Exam  Vitals reviewed.   Constitutional:       General: She is not in acute distress.     Appearance: Normal appearance.   HENT:      Head: Normocephalic and atraumatic.   Eyes:      General:         Right eye: No discharge.         Left eye: No discharge.      Extraocular Movements: Extraocular movements intact.      Pupils: Pupils are equal, round, and reactive to light.   Cardiovascular:      Rate and Rhythm: Normal rate and regular rhythm.      Pulses: Normal pulses.      Heart sounds: No murmur heard.  Pulmonary:      Effort: Pulmonary effort is normal. No  respiratory distress.      Breath sounds: Normal breath sounds. No wheezing.   Abdominal:      General: Abdomen is flat. Bowel sounds are normal.      Palpations: Abdomen is soft.      Tenderness: There is no abdominal tenderness.   Musculoskeletal:         General: No swelling or tenderness.   Skin:     Capillary Refill: Capillary refill takes less than 2 seconds.      Coloration: Skin is not jaundiced.   Neurological:      General: No focal deficit present.      Mental Status: She is alert and oriented to person, place, and time.   Psychiatric:         Mood and Affect: Mood normal.         Behavior: Behavior normal.       Significant Labs: All pertinent labs within the past 24 hours have been reviewed.  CBC:   Recent Labs   Lab 06/18/22  2354 06/19/22  0547 06/20/22  0501   WBC 6.05 5.27 4.62   HGB 8.2* 8.3* 8.3*   HCT 24.7* 25.4* 25.7*   * 423 413     CMP:   Recent Labs   Lab 06/19/22  0210 06/20/22  0501    142   K 3.6 4.3    106   CO2 24 25   GLU 88 91   BUN 15 9   CREATININE 0.6 0.6   CALCIUM 8.2* 8.4*   PROT 5.5* 5.7*   ALBUMIN 3.2* 3.4*   BILITOT 0.4 0.2   ALKPHOS 49* 52*   AST 12 12   ALT 9* 10   ANIONGAP 10 11   EGFRNONAA >60 >60     POCT Glucose:   Recent Labs   Lab 06/18/22  1225 06/18/22  1647   POCTGLUCOSE 101 82       Significant Imaging: I have reviewed all pertinent imaging results/findings within the past 24 hours.

## 2022-06-21 VITALS
OXYGEN SATURATION: 97 % | BODY MASS INDEX: 25.98 KG/M2 | SYSTOLIC BLOOD PRESSURE: 124 MMHG | HEART RATE: 81 BPM | DIASTOLIC BLOOD PRESSURE: 74 MMHG | RESPIRATION RATE: 18 BRPM | TEMPERATURE: 98 F | WEIGHT: 146.63 LBS | HEIGHT: 63 IN

## 2022-06-21 PROBLEM — K92.2 UPPER GI BLEED: Status: RESOLVED | Noted: 2022-06-18 | Resolved: 2022-06-21

## 2022-06-21 LAB
BASOPHILS # BLD AUTO: 0.03 K/UL (ref 0–0.2)
BASOPHILS NFR BLD: 0.8 % (ref 0–1.9)
DIFFERENTIAL METHOD: ABNORMAL
EOSINOPHIL # BLD AUTO: 0.1 K/UL (ref 0–0.5)
EOSINOPHIL NFR BLD: 2.8 % (ref 0–8)
ERYTHROCYTE [DISTWIDTH] IN BLOOD BY AUTOMATED COUNT: 14.9 % (ref 11.5–14.5)
HCT VFR BLD AUTO: 23.6 % (ref 37–48.5)
HCT VFR BLD AUTO: 26.9 % (ref 37–48.5)
HGB BLD-MCNC: 7.5 G/DL (ref 12–16)
HGB BLD-MCNC: 8.5 G/DL (ref 12–16)
IMM GRANULOCYTES # BLD AUTO: 0.02 K/UL (ref 0–0.04)
IMM GRANULOCYTES NFR BLD AUTO: 0.5 % (ref 0–0.5)
LYMPHOCYTES # BLD AUTO: 1.1 K/UL (ref 1–4.8)
LYMPHOCYTES NFR BLD: 28.5 % (ref 18–48)
MCH RBC QN AUTO: 27.8 PG (ref 27–31)
MCHC RBC AUTO-ENTMCNC: 31.8 G/DL (ref 32–36)
MCV RBC AUTO: 87 FL (ref 82–98)
MONOCYTES # BLD AUTO: 0.3 K/UL (ref 0.3–1)
MONOCYTES NFR BLD: 8.6 % (ref 4–15)
NEUTROPHILS # BLD AUTO: 2.3 K/UL (ref 1.8–7.7)
NEUTROPHILS NFR BLD: 58.8 % (ref 38–73)
NRBC BLD-RTO: 0 /100 WBC
PLATELET # BLD AUTO: 354 K/UL (ref 150–450)
PMV BLD AUTO: 9.8 FL (ref 9.2–12.9)
RBC # BLD AUTO: 2.7 M/UL (ref 4–5.4)
WBC # BLD AUTO: 3.97 K/UL (ref 3.9–12.7)

## 2022-06-21 PROCEDURE — 85025 COMPLETE CBC W/AUTO DIFF WBC: CPT | Performed by: STUDENT IN AN ORGANIZED HEALTH CARE EDUCATION/TRAINING PROGRAM

## 2022-06-21 PROCEDURE — 99900035 HC TECH TIME PER 15 MIN (STAT)

## 2022-06-21 PROCEDURE — 25000003 PHARM REV CODE 250: Performed by: STUDENT IN AN ORGANIZED HEALTH CARE EDUCATION/TRAINING PROGRAM

## 2022-06-21 PROCEDURE — 63600175 PHARM REV CODE 636 W HCPCS: Performed by: STUDENT IN AN ORGANIZED HEALTH CARE EDUCATION/TRAINING PROGRAM

## 2022-06-21 PROCEDURE — 94761 N-INVAS EAR/PLS OXIMETRY MLT: CPT

## 2022-06-21 PROCEDURE — 36415 COLL VENOUS BLD VENIPUNCTURE: CPT | Performed by: STUDENT IN AN ORGANIZED HEALTH CARE EDUCATION/TRAINING PROGRAM

## 2022-06-21 PROCEDURE — 85014 HEMATOCRIT: CPT | Performed by: FAMILY MEDICINE

## 2022-06-21 PROCEDURE — C9113 INJ PANTOPRAZOLE SODIUM, VIA: HCPCS | Performed by: STUDENT IN AN ORGANIZED HEALTH CARE EDUCATION/TRAINING PROGRAM

## 2022-06-21 PROCEDURE — 85018 HEMOGLOBIN: CPT | Performed by: FAMILY MEDICINE

## 2022-06-21 PROCEDURE — 36415 COLL VENOUS BLD VENIPUNCTURE: CPT | Performed by: FAMILY MEDICINE

## 2022-06-21 RX ORDER — PANTOPRAZOLE SODIUM 40 MG/1
40 TABLET, DELAYED RELEASE ORAL 2 TIMES DAILY
Qty: 60 TABLET | Refills: 1 | Status: SHIPPED | OUTPATIENT
Start: 2022-06-22 | End: 2022-09-15 | Stop reason: SDUPTHER

## 2022-06-21 RX ORDER — TOPIRAMATE 25 MG/1
TABLET ORAL 2 TIMES DAILY
Status: CANCELLED | OUTPATIENT
Start: 2022-06-21

## 2022-06-21 RX ORDER — TOPIRAMATE 50 MG/1
50 TABLET, FILM COATED ORAL 2 TIMES DAILY
Qty: 60 TABLET | Refills: 2 | Status: SHIPPED | OUTPATIENT
Start: 2022-07-06 | End: 2022-06-30

## 2022-06-21 RX ORDER — TOPIRAMATE 25 MG/1
TABLET ORAL
Qty: 21 TABLET | Refills: 0 | Status: SHIPPED | OUTPATIENT
Start: 2022-06-22 | End: 2022-06-30

## 2022-06-21 RX ORDER — TOPIRAMATE 25 MG/1
25 TABLET ORAL DAILY
Status: DISCONTINUED | OUTPATIENT
Start: 2022-06-21 | End: 2022-06-21 | Stop reason: HOSPADM

## 2022-06-21 RX ADMIN — CALCIUM CARBONATE (ANTACID) CHEW TAB 500 MG 500 MG: 500 CHEW TAB at 09:06

## 2022-06-21 RX ADMIN — PANTOPRAZOLE SODIUM 40 MG: 40 INJECTION, POWDER, FOR SOLUTION INTRAVENOUS at 09:06

## 2022-06-21 RX ADMIN — TOPIRAMATE 25 MG: 25 TABLET, FILM COATED ORAL at 10:06

## 2022-06-21 RX ADMIN — MUPIROCIN: 20 OINTMENT TOPICAL at 09:06

## 2022-06-21 RX ADMIN — ACETAMINOPHEN 650 MG: 325 TABLET ORAL at 11:06

## 2022-06-21 RX ADMIN — ACETAMINOPHEN 650 MG: 325 TABLET ORAL at 07:06

## 2022-06-21 NOTE — NURSING
Pt discharged to care of family/self. VN reviewed AVS, pt denies questions. Return to work letter given to pt. All personal belongings with pt at time of discharge.

## 2022-06-21 NOTE — PLAN OF CARE
AAOX4. LR infused per order. Regular diet continued. Pt denies N/V, SOB, distress. C/O headache earlier in the night relieved with PRN tylenol, fluids and heat compress. BP monitored. Medications given per MAR.    Vital signs stable throughout the night. Safety precautions maintained. Bed alarm set. Call bell within reach. Patient encouraged to call for assistance. Will continue to monitor.

## 2022-06-21 NOTE — DISCHARGE SUMMARY
[facility] Medicine  Discharge Summary      Patient Name: Lila Gee  MRN: [id number]  Patient Class: IP- Inpatient  Admission Date: 6/18/2022  Hospital Length of Stay: 3 days  Discharge Date and Time:  06/21/2022 9:56 AM  Attending Physician: Ortega Devine MD   Discharging Provider: Sabrian Starks MD  Primary Care Provider: Primary Doctor No      HPI:   Patient is a 51 year old female with Pmhx of headaches and thrombocytosis presenting to OSH for same day onset of abdominal pain with emesis. Patient reports she was at work when she developed quick onset of nausea, coffee ground emesis and diaphoresis. Patient immediately wheeled to the ED and workup included CT head without contrast that was negative. Occult blood positive. Patients Hgb 9.4/29.9; NGT placed and noted dark ground return with BR blood. Patient typed and screened. 2 bore iv's obtained and PPI IV started. [place] [facility] [place] bleed.     Patient denies any previous episodes of hemoptysis or GI bleeds. Patient reports over 10 years ago of BC goodys powder use. Patient denies any use currently. Patient reports previously worked up for bleeding, with no source noted or discovered. Patient discharged on iron supplements. Patient reports following with PCP and informed to stop taking po Iron supplements. Patient reports previously followed by heme/onc for thrombocytosis. Patient denies any alcohol use, denies any tobacco use or drug use. Patient denies any NSAID use as well.       Procedure(s) (LRB):  EGD (ESOPHAGOGASTRODUODENOSCOPY) (N/A)      Hospital Course:   She was [facility] on 6/[facility]. Upon admission, IV Protonix was administered for 72 hours. She was also given an 2 iron sucrose infusions. An EGD was performed showing 2 non-bleeding ulcers. Patient is being discharged home in good condition. She is sent home with oral Protonix for 8 weeks and strong  recommendations to avoid NSAIDs.        Goals of Care Treatment Preferences:  Code Status: Full Code      Consults:   Consults (From admission, onward)        Status Ordering Provider     IP consult to case management  Once        Provider:  (Not yet assigned)    Acknowledged KO OLIVER     Inpatient consult to Gastroenterology  Once        Provider:  Davy Palacios MD    Completed BEV GASTON        Physical Exam:  Gen: Well nourished and developed, NAD, appears stated age  HEENT: normocephalic, atraumatic, PERRLA,  Neck: trachea midline  CV: RRR, S1 and S2 present, no murmurs, no LE edema, radial and dorsalis pedis pulses equal and present bilaterally  Resp: breathing comfortably on room air, CTAB, no wheezes or crackles  Abd: Non-distended, BSx4, non-tender, tympanic throughout  Skin: No rashes or abnormal skin lesions, no apparent jaundice or bruising  Neuro: A&Ox4, sensation intact throughout, spontaneous movements  MSK: Full ROM of all extremities  Psych: Logical thought process, answers questions appropriately      Duodenal ulcer  - oral PPI BID for 8 weeks.   - Avoid all NSAID medications. Tylenol as needed for headaches (up to 2g daily).    Gastritis and duodenitis          Final Active Diagnoses:    Diagnosis Date Noted POA    Gastritis and duodenitis [K29.90]  Yes    Duodenal ulcer [K26.9]  Unknown      Problems Resolved During this Admission:    Diagnosis Date Noted Date Resolved POA    PRINCIPAL PROBLEM:  Upper GI bleed [K92.2] 06/18/2022 06/21/2022 Yes    Coffee ground emesis [K92.0]  06/21/2022 Yes       Discharged Condition: stable    Disposition: Home or Self Care    Follow Up:    Patient Instructions:      Diet Adult Regular     Notify your health care provider if you experience any of the following:  increased confusion or weakness     Notify your health care provider if you experience any of the following:  persistent dizziness, light-headedness, or visual disturbances     Notify  your health care provider if you experience any of the following:  worsening rash     Notify your health care provider if you experience any of the following:  severe persistent headache     Notify your health care provider if you experience any of the following:  difficulty breathing or increased cough     Notify your health care provider if you experience any of the following:  redness, tenderness, or signs of infection (pain, swelling, redness, odor or green/yellow discharge around incision site)     Notify your health care provider if you experience any of the following:  severe uncontrolled pain     Notify your health care provider if you experience any of the following:  persistent nausea and vomiting or diarrhea     Notify your health care provider if you experience any of the following:  temperature >100.4     Activity as tolerated       Significant Diagnostic Studies: Labs: All labs within the past 24 hours have been reviewed    Pending Diagnostic Studies:     None         Medications:  Reconciled Home Medications:      Medication List      START taking these medications    pantoprazole 40 MG tablet  Commonly known as: PROTONIX  Take 1 tablet (40 mg total) by mouth 2 (two) times daily.  Start taking on: June 22, 2022     * topiramate 25 MG tablet  Commonly known as: TOPAMAX  Take 1 tablet (25 mg total) by mouth once daily for 7 days, THEN 1 tablet (25 mg total) 2 (two) times daily for 7 days.  Start taking on: June 22, 2022     * topiramate 50 MG tablet  Commonly known as: TOPAMAX  Take 1 tablet (50 mg total) by mouth 2 (two) times daily.  Start taking on: July 6, 2022         * This list has 2 medication(s) that are the same as other medications prescribed for you. Read the directions carefully, and ask your doctor or other care provider to review them with you.            STOP taking these medications    ferrous sulfate 325 mg (65 mg iron) Tab tablet  Commonly known as: FEOSOL            Indwelling  Lines/Drains at time of discharge:   Lines/Drains/Airways     None                 Time spent on the discharge of patient: 33 minutes         Sabrina Starks MD  Department of Hospital Medicine  [facility]

## 2022-06-21 NOTE — ASSESSMENT & PLAN NOTE
- oral PPI BID for 8 weeks.   - Avoid all NSAID medications. Tylenol as needed for headaches (up to 2g daily).

## 2022-06-21 NOTE — PLAN OF CARE
VN reviewed discharge instructions with pt. Using teach back method.  AVS printed and handed to pt by bedside nurse.  Reviewed follow-up appointments, medications, diet, and importance of medication compliance.  Reviewed home care instructions, treatment plan, self-management, and when to seek medical attention.  Allowed time for questions.  All questions answered.  Patient verbalized complete understanding of discharge instructions and voices no concerns.     Discharge instructions complete.  Bedside delivery done.  Pt waiting on ride home.  Bedside nurse notified.

## 2022-06-21 NOTE — PLAN OF CARE
Discharge orders noted. No DME or Home Health ordered. PCP and GI follow-up appointments from access navigator requested. Awaiting appointments to be scheduled. No further Case Management needs.     PCP and GI follow-up appointments scheduled.    1116-- went to meet with patient to review follow-up information. Patient asleep at this time. No further needs.    4478-- met with patient. She is awaiting her daughter to transport home. She is aware of follow-up information. All questions answered.    Patient Contacts    Name Relation Home Work Mobile   NORMA ORNELAS Daughter [phone]     Future Appointments   Date Time Provider Department Center   6/30/2022  8:00 AM Jose Aguayo PA-C [facility]   7/11/2022  2:45 PM Jaime Vega MD [facility]        06/21/22 1000   Final Note   Assessment Type Final Discharge Note   Anticipated Discharge Disposition Home   Hospital Resources/Appts/Education Provided Appointments scheduled by Navigator/Coordinator   Post-Acute Status   Discharge Delays None known at this time     Linda Monsivais RN    [phone]

## 2022-06-21 NOTE — PLAN OF CARE
Pt remains A+Ox4. Received IVF this AM. BP noted to be lower after fluids stopped, pt with no c/o nausea, dizziness or other symptoms. Persistent headache, some relief from PO tylenol. Voiding freely. Tolerating diet well. Possible discharge tomorrow. Safety precautions maintained.       Problem: Adult Inpatient Plan of Care  Goal: Plan of Care Review  Outcome: Ongoing, Progressing  Goal: Absence of Hospital-Acquired Illness or Injury  Outcome: Ongoing, Progressing  Goal: Readiness for Transition of Care  Outcome: Ongoing, Progressing     Problem: Skin Injury Risk Increased  Goal: Skin Health and Integrity  Outcome: Ongoing, Progressing     Problem: Bleeding (Gastrointestinal Bleeding)  Goal: Hemostasis  Outcome: Ongoing, Progressing     Problem: Nausea and Vomiting  Goal: Fluid and Electrolyte Balance  Outcome: Ongoing, Progressing     Problem: Pain Acute  Goal: Acceptable Pain Control and Functional Ability  Outcome: Ongoing, Progressing

## 2022-06-22 LAB
BLD PROD TYP BPU: NORMAL
BLD PROD TYP BPU: NORMAL
BLOOD UNIT EXPIRATION DATE: NORMAL
BLOOD UNIT EXPIRATION DATE: NORMAL
BLOOD UNIT TYPE CODE: 5100
BLOOD UNIT TYPE CODE: 5100
BLOOD UNIT TYPE: NORMAL
BLOOD UNIT TYPE: NORMAL
CODING SYSTEM: NORMAL
CODING SYSTEM: NORMAL
DISPENSE STATUS: NORMAL
DISPENSE STATUS: NORMAL
TRANS ERYTHROCYTES VOL PATIENT: NORMAL ML
TRANS ERYTHROCYTES VOL PATIENT: NORMAL ML

## 2022-06-22 NOTE — PHYSICIAN QUERY
PT Name: Lila Gee  MR #: [id number]     DOCUMENTATION CLARIFICATION      CDS Katiuska Alexis RN, BSN        Contact Information:    [email or web address]         This form is a permanent document in the medical record.    Query Date: June 22, 2022    By submitting this query, we are merely seeking further clarification of documentation to reflect the severity of illness of your patient. Please utilize your independent clinical judgment when addressing the question(s) below.     The Medical Record contains the following:   Indicators   Supporting Clinical Findings Location in Medical Record    Gastrointestinal Ulcer Documented     X   EGD/Colonscopy Findings Impression:                 - One small non-bleeding duodenal bulb ulcer with a small nonbleeding visible vessel (Frank Class  IIa). This represents the etiology of the patient's prior coffee ground emesis and acute   anemia component. Treated with bipolar cautery and   hemostatic clips x2. Hemostatic spray applied for secondary hemostasis with no active bleeding at end of the procedure.   - One small non-bleeding duodenal bulb ulcer with a clean ulcer base (Frank Class III).    - No specimens collected.  6/18 EGD     Pathology Findings      Radiology Findings     X   Treatment/Medication Upon admission, IV Protonix was administered for 72 hours. She was also given an 2 iron sucrose infusions.  She is sent home with oral Protonix for 8 weeks and strong recommendations to avoid NSAIDs.  6/21 DCS    X   Other 51 year old female with Pmhx of headaches and thrombocytosis presenting to OSH for same day onset of abdominal pain with emesis. Patient reports she was at work when she developed quick onset of nausea, coffee ground emesis and diaphoresis. Occult blood positive. Patients Hgb 9.4/29.9; NGT placed and noted dark ground return with BR blood.  Patient reports over 10 years ago of BC goodys powder use. Patient denies any use currently. Patient reports  previously worked up for bleeding, with no source noted or discovered.  6/18 H&P              Please further specify the acuity of the duodenal  ulcer:    [   ] Acute     [  X ] Chronic     [  ] Acute on chronic      [   ] Clinically Undetermined           Please document in your progress notes daily for the duration of treatment until resolved, and include in your discharge summary.  Form No. [id number]

## 2022-06-30 ENCOUNTER — OFFICE VISIT (OUTPATIENT)
Dept: FAMILY MEDICINE | Facility: HOSPITAL | Age: 52
End: 2022-06-30
Attending: FAMILY MEDICINE
Payer: COMMERCIAL

## 2022-06-30 VITALS
BODY MASS INDEX: 24.34 KG/M2 | HEIGHT: 63 IN | DIASTOLIC BLOOD PRESSURE: 70 MMHG | HEART RATE: 62 BPM | SYSTOLIC BLOOD PRESSURE: 110 MMHG | WEIGHT: 137.38 LBS

## 2022-06-30 DIAGNOSIS — G44.229 CHRONIC TENSION-TYPE HEADACHE, NOT INTRACTABLE: ICD-10-CM

## 2022-06-30 DIAGNOSIS — Z12.31 ENCOUNTER FOR SCREENING MAMMOGRAM FOR MALIGNANT NEOPLASM OF BREAST: ICD-10-CM

## 2022-06-30 DIAGNOSIS — K26.9 DUODENAL ULCER: Primary | ICD-10-CM

## 2022-06-30 DIAGNOSIS — Z12.4 PAP SMEAR FOR CERVICAL CANCER SCREENING: ICD-10-CM

## 2022-06-30 DIAGNOSIS — Z00.00 HEALTHCARE MAINTENANCE: ICD-10-CM

## 2022-06-30 PROCEDURE — 99214 OFFICE O/P EST MOD 30 MIN: CPT | Performed by: PHYSICIAN ASSISTANT

## 2022-06-30 RX ORDER — ATOGEPANT 60 MG/1
TABLET ORAL
COMMUNITY
Start: 2022-06-28

## 2022-06-30 RX ORDER — UBROGEPANT 100 MG/1
TABLET ORAL
COMMUNITY
Start: 2022-06-28

## 2022-06-30 RX ORDER — FOLIC ACID/MULTIVIT,IRON,MINER 0.4MG-18MG
TABLET ORAL
COMMUNITY
Start: 2022-06-22

## 2022-06-30 NOTE — PROGRESS NOTES
Subjective:       Patient ID: Lila Gee is a 51 y.o. female.    Chief Complaint: Hospital Follow Up    Ms. Gee is a 52 y/o female with PMH of migraines here today for hospital follow up. She was recently admitted with GI bleeding. Presented to the ED after coffee-ground emesis. Of note, she had been taking daily NSAIDs due to her chronic headaches. EGD with one small non-bleeding duodenal bulb ulcer with a small nonbleeding visible vessel, which was the likely etiology of her bleed. Scope also showed one small non-bleeding duodenal bulb ulcer. While admitted she was given IV iron, but no blood. Improved during stay and d/c'd on BID PPI.     Today Ms. Gee is unaccompanied. From a GI perspective she is doing well. Denies any abdominal pain, coffee-ground emesis, maggie blood, dark/tarry stools, BRBPR, n/v/d/c. She also denies dizziness, lightheadedness, palpitations. She is taking her PPI as prescribed and has follow up with GI in ~1 week. She does however have a complaint of increased headaches. She is followed by Neurology and had a visit with him yesterday, Dr. Mena. She had previously been on Topamax and he changed her medications yesterday to Qulipta daily for controller and Ubrelvy PRN for abortive. She has not filled her prescriptions yet, but did get samples from his office. She took the abortive therapy x2 yesterday without improvement. She then took Excedrin with only acetaminophen and caffeine with some relief (no NSAIDs). She is very concerned that her PPI might be worsening her headaches, which are interfering with her ability to work.     PCP: Est Here with Dr. Starks  CRCS: Following with Dr. Vega  PAP: Years ago, due. Prefers GYN.     Review of Systems   Constitutional: Positive for activity change.   Cardiovascular: Negative for palpitations.   Neurological: Positive for headaches (worsening). Negative for dizziness and light-headedness.   All other systems reviewed and are  "negative.    Past Medical History:   Diagnosis Date    Anemia     Hypothyroidism      Family History   Problem Relation Age of Onset    Fibromyalgia Mother     Irritable bowel syndrome Mother     Fibromyalgia Sister     Diabetes Brother      Past Surgical History:   Procedure Laterality Date    ESOPHAGOGASTRODUODENOSCOPY N/A 6/18/2022    Procedure: EGD (ESOPHAGOGASTRODUODENOSCOPY);  Surgeon: Jaime Vega MD;  Location: [facility];  Service: Endoscopy;  Laterality: N/A;    SINUS SURGERY       Social History     Socioeconomic History    Marital status: Single   Occupational History    Occupation: lpn   Tobacco Use    Smoking status: Never Smoker    Smokeless tobacco: Never Used   Substance and Sexual Activity    Alcohol use: No    Drug use: No    Sexual activity: Not Currently     Partners: Male     Birth control/protection: Condom     Current Outpatient Medications   Medication Instructions    atogepant (QULIPTA) 60 mg Tab No dose, route, or frequency recorded.    multivitamin-min-iron-FA-vit K (ONE DAILY WOMEN'S) 18 mg iron-400 mcg-25 mcg Tab No dose, route, or frequency recorded.    pantoprazole (PROTONIX) 40 mg, Oral, 2 times daily    [START ON 7/6/2022] topiramate (TOPAMAX) 50 mg, Oral, 2 times daily    ubrogepant (UBROGEPANT) 100 mg tablet No dose, route, or frequency recorded.     Objective:     Vitals:    06/30/22 0815   BP: 110/70   Pulse: 62   Weight: 62.3 kg (137 lb 5.6 oz)   Height: 5' 3" (1.6 m)     Physical Exam  Vitals and nursing note reviewed.   Constitutional:       General: She is not in acute distress.     Appearance: She is well-developed. She is not diaphoretic.   HENT:      Head: Normocephalic and atraumatic.   Eyes:      Extraocular Movements: Extraocular movements intact.      Conjunctiva/sclera: Conjunctivae normal.   Neck:      Trachea: No tracheal deviation.   Cardiovascular:      Rate and Rhythm: Normal rate and regular rhythm.      Heart sounds: Normal heart " sounds. No murmur heard.  Pulmonary:      Effort: Pulmonary effort is normal. No respiratory distress.      Breath sounds: Normal breath sounds. No wheezing.   Chest:      Chest wall: No tenderness.   Abdominal:      General: Bowel sounds are normal. There is no distension.      Palpations: Abdomen is soft.      Tenderness: There is no abdominal tenderness. There is no guarding.   Musculoskeletal:         General: No tenderness or deformity. Normal range of motion.   Skin:     General: Skin is warm and dry.      Coloration: Skin is not jaundiced.      Findings: No bruising or lesion.   Neurological:      General: No focal deficit present.      Mental Status: She is alert and oriented to person, place, and time.      Coordination: Coordination normal.   Psychiatric:         Mood and Affect: Mood normal.         Behavior: Behavior normal.         Thought Content: Thought content normal.         Judgment: Judgment normal.     CT Head  FINDINGS:  Ventricles and sulci are normal in size for age without evidence of hydrocephalus. No extra-axial blood or fluid collections.  The brain parenchyma is normal. No parenchymal mass, hemorrhage, edema or major vascular distribution infarct.     No calvarial fracture.  The scalp is unremarkable.  Bilateral paranasal sinuses and mastoid air cells are clear.     Impression:     No acute intracranial abnormality.     EGD  Findings:        The examined esophagus was normal.        Patchy mildly erythematous mucosa without bleeding was found in the        gastric antrum.        One non-bleeding cratered duodenal ulcer with a clean ulcer base        (Frank Class III) was found in the duodenal bulb. The lesion was        10 mm in largest dimension.        One non-bleeding cratered duodenal ulcer with a nonbleeding visible        vessel (Frank Class IIa) was found in the duodenal bulb. The        lesion was 10 mm in largest dimension. Coagulation for hemostasis        using bipolar  probe was successful. Estimated blood loss was        minimal. For hemostasis, two hemostatic clips were successfully        placed. There was no bleeding at the end of the procedure. For        secondary hemostasis, hemostatic spray was deployed. A single spray        was applied. There was no bleeding at the end of the procedure.        The exam of the duodenum was otherwise normal.   Impression:            51 y.o. female presenting with coffee-ground                          emesis with associated anemia in the setting of                          daily NSAID use with EGD findings below:                          - One small non-bleeding duodenal bulb ulcer with                          a small nonbleeding visible vessel (Frank Class                          IIa). This represents the etiology of the                          patient's prior coffee ground emesis and acute                          anemia component. Treated with bipolar cautery and                          hemostatic clips x2. Hemostatic spray applied for                          secondary hemostasis with no active bleeding at                          end of the procedure.                          - One small non-bleeding duodenal bulb ulcer with                          a clean ulcer base (Frank Class III).                          - No specimens collected.   Recommendation:        - Return patient to hospital giron for ongoing care.                          - NPO for 4 hours, then advance to clear liquid                          diet today, then advance as tolerated tomorrow.                          - IV PPI BID for 72 hours total. After 72 hours,                          transition to oral PPI for 8 weeks.                          - Avoid all NSAID medications. Tylenol as needed                          for headaches (up to 2g from all sources daily).                          - AM CBC. Will follow up tomorrow. Remainder as                           outlined per inpatient notes.   Assessment:       1. Duodenal ulcer    2. Healthcare maintenance    3. Pap smear for cervical cancer screening    4. Encounter for screening mammogram for malignant neoplasm of breast    5. Chronic tension-type headache, not intractable        Plan:       Duodenal ulcer   -Continue PPI BID. Messaged Dr. Vega for guidance about medication less likely to cause headaches. Will follow up with him as scheduled, 07/11/2022  -     CBC Auto Differential; Future; Expected date: 06/30/2022    Chronic tension-type headache, not intractable   -Headaches are worsening, but just started new medication yesterday. Will check with pharmacy about pricing.     Healthcare maintenance  -     Lipid Panel; Future; Expected date: 06/30/2022    Pap smear for cervical cancer screening  -     Ambulatory referral/consult to Obstetrics / Gynecology; Future; Expected date: 07/07/2022    Encounter for screening mammogram for malignant neoplasm of breast  -     Mammo Digital Screening Bilat w/ Sven; Future; Expected date: 06/30/2022      Future Appointments   Date Time Provider Department Center   7/11/2022  2:45 PM Jaime Vega MD [facility]   8/30/2022  8:40 AM Sabrina Starks MD [facility] Oniel Schultzi

## 2022-07-08 ENCOUNTER — IMMUNIZATION (OUTPATIENT)
Dept: PRIMARY CARE CLINIC | Facility: CLINIC | Age: 52
End: 2022-07-08
Payer: COMMERCIAL

## 2022-07-08 DIAGNOSIS — Z23 NEED FOR VACCINATION: Primary | ICD-10-CM

## 2022-07-08 PROCEDURE — 91300 COVID-19, MRNA, LNP-S, PF, 30 MCG/0.3 ML DOSE VACCINE: CPT | Mod: PBBFAC | Performed by: FAMILY MEDICINE

## 2022-07-11 ENCOUNTER — OFFICE VISIT (OUTPATIENT)
Dept: NEUROLOGY | Facility: HOSPITAL | Age: 52
End: 2022-07-11
Attending: INTERNAL MEDICINE
Payer: COMMERCIAL

## 2022-07-11 VITALS
SYSTOLIC BLOOD PRESSURE: 122 MMHG | HEART RATE: 101 BPM | BODY MASS INDEX: 24.23 KG/M2 | DIASTOLIC BLOOD PRESSURE: 81 MMHG | WEIGHT: 136.81 LBS

## 2022-07-11 DIAGNOSIS — Z12.12 ENCOUNTER FOR COLORECTAL CANCER SCREENING: Primary | ICD-10-CM

## 2022-07-11 DIAGNOSIS — Z12.11 ENCOUNTER FOR COLORECTAL CANCER SCREENING: Primary | ICD-10-CM

## 2022-07-11 PROCEDURE — 99213 OFFICE O/P EST LOW 20 MIN: CPT | Performed by: INTERNAL MEDICINE

## 2022-07-11 RX ORDER — SOD SULF/POT CHLORIDE/MAG SULF 1.479 G
12 TABLET ORAL DAILY
Qty: 24 TABLET | Refills: 0 | Status: SHIPPED | OUTPATIENT
Start: 2022-07-11

## 2022-07-11 NOTE — PATIENT INSTRUCTIONS
SUTAB Instructions    [facility] [address]  Clinic Office [phone]  Endoscopy Lab [phone]    You are scheduled for a Colonoscopy with Dr. Vega on Thursday, September 12, 2022 at [facility] in [place].    Check in at the Hospital -1st floor, Information desk. A covid test will be required 3 days before your procedure.  Call [phone] to reschedule.    An adult friend/family member must come with you to drive you home.  You cannot drive, take a taxi, Uber/Lyft or bus to leave the Endoscopy Center alone.  If you do not have someone to drive you home, your test will be cancelled.     Please follow the directions of your doctor if you take any pills that thin your blood. If you take these meds: Aggrenox, Brilinta, Effient, Eliquis, Lovenox, Plavix, Pletal, Pradaxa, Ticilid, Xarelto or Coumadin, let the doctor's office know.    DON'T: On the morning of the test do not take insulin or pills for diabetes.     DO: On the morning of the test, do take any pills for blood pressure, heart, anti-rejection and or seizures with a small sip of water. Bring any inhalers with you.    To have a good prep, you must follow these instructions - please do not use the directions from the pharmacy.    The doctor will send a prescription for the SUTAB.    The Day Before the test:    You can only drink CLEAR LIQUIDS the whole day before your test.  You can't eat any food for the whole day.    You CAN have:  Water, Coffee or decaf coffee (no milk or cream)  Tea  Soft drinks - regular and sugar free  Jell-O (green or yellow)  Apple Juice, grape juice, white cranberry juice  Gatorade, Power Aid, Crystal Light, Wilmer Aid  Lemonade and Limeade  Bouillon, clear soup  Snowball, popsicles  YOU CAN'T DRINK ANYTHING RED, PURPLE ORANGE OR BLUE   YOU CAN'T DRINK ALCOHOL  ONLY DRINK WHAT IS ON THE LIST      At 5 pm the night before your test:    Open the bottle of 12 tablets. Fill the provided cup with  water up to the line = 16 oz. Swallow each pill with water. Drink the rest of the water in the cup in 20 minutes.    At 6 pm:  Fill the cup with water up to the line = 16 oz. Drink the cup of water in 30 minutes.    At 7 pm:  Fill the cup with water up to the line = 16 oz. Drink the cup of water in 30 minutes.     Continue to drink water or clear liquids until you go to sleep.      The Day of the test - We will call you 2 days before your test to tell you what time to get to the hospital. We will also tell you when to do the next steps.    5 hours before you come to the hospital (this may be in the middle of the night): ___________________= time  Open the bottle of 12 tablets. Fill the cup with water up to the line = 16 oz. Swallow each pill with water. Drink the rest of the water in the cup in 20 minutes.    At 4 hours before you come to the hospital: ______________= time  Fill the cup with water up to the line = 16 oz. Drink the cup of water in 30 minutes.     At 3 hours before you come to the hospital: ______________= time    YOU CAN'T EAT OR DRINK ANYTHING ELSE ONCE YOU FINISH THE WATER AT _____________ = TIME    Leave all valuables and jewelry at home. You will be at the hospital for 2-4 hours.    Call the Endoscopy department at [phone] with any questions about your procedure.          Please give this Coupon Code to your pharmacist.    BIN: [id number]  MAUREENN: DELORES  GROUP: [id number]  MEMBER ID: [id number]

## 2022-07-11 NOTE — PROGRESS NOTES
[facility]    CC: Screening Colonoscopy    HPI 51 y.o. female with history of duodenal ulcer associated with anemia and NSAID use presenting for screening colonoscopy.     Patient with recent admission secondary to large volume coffee ground emesis with EGD showing non-bleeding duodenal ulcers s/p bipolar cautery, hemostatic clips, and hemospray with appropriate hemostasis. Patient reports no issues since discharge and denies any further emesis or nausea, vomiting, melena, or change in bowel habits. She is refraining from NSAID use at this time. She denies any prior colonoscopy or family history of colon cancer.     Past Medical History  Past Medical History:   Diagnosis Date    Anemia     Hypothyroidism      Physical Examination  General appearance: alert, cooperative, no distress  Abdomen: soft, non-tender; bowel sounds normoactive; no organomegaly  Neurologic: Alert and conversant    Labs:  Hb = 10 I MCV = 92    Imaging:  EGD (6/2022) with one small non-bleeding duodenal bulb ulcer with clean base and one small non-bleeding duodenal bulb ulcer with visible non-bleeding vessel.     Assessment:   51 y.o. female with history of duodenal ulcer associated with anemia and NSAID use presenting for screening colonoscopy.    Plan:  - Continue iron supplementation and avoidance of NSAIDs  - Colonoscopy scheduled for September 12th  - Sutab ordered and sent to patients pharmacy  - Instructions for endoscopy provided  - Patient known to me from hospital stay but reinterview next visit. Note she is an [employer] employee     [facility]   [address], [address]   [place] [address] [phone]

## 2022-09-07 ENCOUNTER — TELEPHONE (OUTPATIENT)
Dept: ENDOSCOPY | Facility: HOSPITAL | Age: 52
End: 2022-09-07
Payer: COMMERCIAL

## 2022-09-07 NOTE — TELEPHONE ENCOUNTER
Left message instructing patient to call dept @ [phone] between 8am-3pm.    Arrival time to be given @ 0700  Colon/Sutab  Covid - Boosted  (Message sent via [facility] portal)

## 2022-09-08 ENCOUNTER — TELEPHONE (OUTPATIENT)
Dept: ENDOSCOPY | Facility: HOSPITAL | Age: 52
End: 2022-09-08
Payer: COMMERCIAL

## 2022-09-08 NOTE — TELEPHONE ENCOUNTER
Left message instructing patient to call dept @ [phone] between 8am-4pm.    Arrival time to be given @ 0700    Attempted to call work number, put on hold then the phone was disconnected   (Message sent via [facility] portal)

## 2022-09-09 ENCOUNTER — TELEPHONE (OUTPATIENT)
Dept: ENDOSCOPY | Facility: HOSPITAL | Age: 52
End: 2022-09-09
Payer: COMMERCIAL

## 2022-09-09 NOTE — TELEPHONE ENCOUNTER
Spoke with patient about arrival time @ 0700  Covid test = Boosted     Prep instructions reviewed: the day before the procedure, follow a clear liquid diet all day, then start the first 1/2 of prep at 5pm and take 2nd 1/2 of prep @ 0200.  Pt must be completely NPO when prep completed @ 0400.              Medications: Do not take Insulin or oral diabetic medications the day of the procedure.  Take as prescribed: heart, seizure and blood pressure medication in the morning with a sip of water (less than an ounce).  Take any breathing medications and bring inhalers to hospital with you Leave all valuables and jewelry at home.     Wear comfortable clothes to procedure to change into hospital gown You cannot drive for 24 hours after your procedure because you will receive sedation for your procedure to make you comfortable.  A ride must be provided at discharge.

## 2022-09-11 NOTE — H&P
[facility]    CC: Screening Colonoscopy     HPI: 52 y.o. female  with history of duodenal ulcer associated with anemia and NSAID use presenting for screening colonoscopy.      Patient with recent admission secondary to large volume coffee ground emesis with EGD showing non-bleeding duodenal ulcers s/p bipolar cautery, hemostatic clips, and hemospray with appropriate hemostasis. Patient reports no issues since discharge and denies any further emesis or nausea, vomiting, melena, or change in bowel habits. She is refraining from NSAID use at this time. She denies any prior colonoscopy or family history of colon cancer.     Note she is an [facility] employee.    Past Medical History  Anemia   Peptic ulcer disease   Hypothyroidism     Physical Examination  General appearance: alert, cooperative, no distress  Lungs: clear to auscultation bilaterally, no dullness to percussion bilaterally  Heart: regular rate and rhythm without rub; no displacement of the PMI   Abdomen: soft, non-tender; bowel sounds normoactive; no organomegaly    Assessment:   52 y.o. female with history of duodenal ulcer associated with anemia and NSAID use presenting for screening colonoscopy.     Plan:  - Proceed with screening colonoscopy today     Jaime Vega MD   [address], [address]   [place] [address] [phone]

## 2022-09-12 ENCOUNTER — ANESTHESIA (OUTPATIENT)
Dept: ENDOSCOPY | Facility: HOSPITAL | Age: 52
End: 2022-09-12
Payer: COMMERCIAL

## 2022-09-12 ENCOUNTER — ANESTHESIA EVENT (OUTPATIENT)
Dept: ENDOSCOPY | Facility: HOSPITAL | Age: 52
End: 2022-09-12
Payer: COMMERCIAL

## 2022-09-12 ENCOUNTER — HOSPITAL ENCOUNTER (OUTPATIENT)
Facility: HOSPITAL | Age: 52
Discharge: HOME | End: 2022-09-12
Attending: INTERNAL MEDICINE | Admitting: INTERNAL MEDICINE
Payer: COMMERCIAL

## 2022-09-12 VITALS
HEART RATE: 59 BPM | TEMPERATURE: 97 F | RESPIRATION RATE: 12 BRPM | OXYGEN SATURATION: 99 % | SYSTOLIC BLOOD PRESSURE: 102 MMHG | DIASTOLIC BLOOD PRESSURE: 72 MMHG | BODY MASS INDEX: 23.92 KG/M2 | WEIGHT: 135 LBS | HEIGHT: 63 IN

## 2022-09-12 DIAGNOSIS — K29.90 GASTRITIS AND DUODENITIS: Primary | ICD-10-CM

## 2022-09-12 DIAGNOSIS — Z12.11 COLON CANCER SCREENING: ICD-10-CM

## 2022-09-12 PROCEDURE — 63600175 PHARM REV CODE 636 W HCPCS: Performed by: NURSE ANESTHETIST, CERTIFIED REGISTERED

## 2022-09-12 PROCEDURE — G0121 COLON CA SCRN NOT HI RSK IND: HCPCS | Performed by: INTERNAL MEDICINE

## 2022-09-12 PROCEDURE — 37000008 HC ANESTHESIA 1ST 15 MINUTES: Performed by: INTERNAL MEDICINE

## 2022-09-12 PROCEDURE — 25000003 PHARM REV CODE 250: Performed by: NURSE ANESTHETIST, CERTIFIED REGISTERED

## 2022-09-12 PROCEDURE — 37000009 HC ANESTHESIA EA ADD 15 MINS: Performed by: INTERNAL MEDICINE

## 2022-09-12 PROCEDURE — 25000003 PHARM REV CODE 250: Performed by: INTERNAL MEDICINE

## 2022-09-12 RX ORDER — PROPOFOL 10 MG/ML
VIAL (ML) INTRAVENOUS CONTINUOUS PRN
Status: DISCONTINUED | OUTPATIENT
Start: 2022-09-12 | End: 2022-09-12

## 2022-09-12 RX ORDER — SODIUM CHLORIDE 0.9 % (FLUSH) 0.9 %
10 SYRINGE (ML) INJECTION
Status: DISCONTINUED | OUTPATIENT
Start: 2022-09-12 | End: 2022-09-12 | Stop reason: HOSPADM

## 2022-09-12 RX ORDER — SODIUM CHLORIDE 9 MG/ML
INJECTION, SOLUTION INTRAVENOUS CONTINUOUS
Status: DISCONTINUED | OUTPATIENT
Start: 2022-09-12 | End: 2022-09-12 | Stop reason: HOSPADM

## 2022-09-12 RX ORDER — LIDOCAINE HYDROCHLORIDE 20 MG/ML
INJECTION INTRAVENOUS
Status: DISCONTINUED | OUTPATIENT
Start: 2022-09-12 | End: 2022-09-12

## 2022-09-12 RX ORDER — PROPOFOL 10 MG/ML
VIAL (ML) INTRAVENOUS
Status: DISCONTINUED | OUTPATIENT
Start: 2022-09-12 | End: 2022-09-12

## 2022-09-12 RX ORDER — ONDANSETRON 2 MG/ML
INJECTION INTRAMUSCULAR; INTRAVENOUS
Status: DISCONTINUED | OUTPATIENT
Start: 2022-09-12 | End: 2022-09-12

## 2022-09-12 RX ADMIN — SODIUM CHLORIDE: 0.9 INJECTION, SOLUTION INTRAVENOUS at 07:09

## 2022-09-12 RX ADMIN — ONDANSETRON 4 MG: 2 INJECTION, SOLUTION INTRAMUSCULAR; INTRAVENOUS at 08:09

## 2022-09-12 RX ADMIN — PROPOFOL 75 MG: 10 INJECTION, EMULSION INTRAVENOUS at 08:09

## 2022-09-12 RX ADMIN — PROPOFOL 175 MCG/KG/MIN: 10 INJECTION, EMULSION INTRAVENOUS at 08:09

## 2022-09-12 RX ADMIN — LIDOCAINE HYDROCHLORIDE 100 MG: 20 INJECTION, SOLUTION INTRAVENOUS at 08:09

## 2022-09-12 NOTE — PLAN OF CARE
\Pt procedure and recovery time completed with no complications. Pt AAO x 4. Discharge instructions given and explained. Pt verbalizes understanding.Pt denies any pain or discomfort at this time.IV discontinued as ordered.

## 2022-09-12 NOTE — PROVATION PATIENT INSTRUCTIONS
Discharge Summary/Instructions after an Endoscopic Procedure  Patient Name: Lila Gee  Patient MRN: [id number]  Patient YOB: 1970 Monday, September 12, 2022  Jaime Vega MD  Dear patient,  As a result of recent federal legislation (The Federal Cures Act), you may   receive lab or pathology results from your procedure in your [facility]   account before your physician is able to contact you. Your physician or   their representative will relay the results to you with their   recommendations at their soonest availability.  Thank you,  Your health is very important to us during the Covid Crisis. Following your   procedure today, you will receive a daily text for 2 weeks asking about   signs or symptoms of Covid 19.  Please respond to this text when you   receive it so we can follow up and keep you as safe as possible.   RESTRICTIONS:  During your procedure today, you received medications for sedation.  These   medications may affect your judgment, balance and coordination.  Therefore,   for 24 hours, you have the following restrictions:   - DO NOT drive a car, operate machinery, make legal/financial decisions,   sign important papers or drink alcohol.    ACTIVITY:  Today: no heavy lifting, straining or running due to procedural   sedation/anesthesia.  The following day: return to full activity including work.  DIET:  Eat and drink normally unless instructed otherwise.     TREATMENT FOR COMMON SIDE EFFECTS:  - Mild abdominal pain, nausea, belching, bloating or excessive gas:  rest,   eat lightly and use a heating pad.  - Sore Throat: treat with throat lozenges and/or gargle with warm salt   water.  - Because air was used during the procedure, expelling large amounts of air   from your rectum or belching is normal.  - If a bowel prep was taken, you may not have a bowel movement for 1-3 days.    This is normal.  SYMPTOMS TO WATCH FOR AND REPORT TO YOUR PHYSICIAN:  1. Abdominal pain or bloating, other than  gas cramps.  2. Chest pain.  3. Back pain.  4. Signs of infection such as: chills or fever occurring within 24 hours   after the procedure.  5. Rectal bleeding, which would show as bright red, maroon, or black stools.   (A tablespoon of blood from the rectum is not serious, especially if   hemorrhoids are present.)  6. Vomiting.  7. Weakness or dizziness.  GO DIRECTLY TO THE NEAREST EMERGENCY ROOM IF YOU HAVE ANY OF THE FOLLOWING:      Difficulty breathing              Chills and/or fever over 101 F   Persistent vomiting and/or vomiting blood   Severe abdominal pain   Severe chest pain   Black, tarry stools   Bleeding- more than one tablespoon   Any other symptom or condition that you feel may need urgent attention  Your doctor recommends these additional instructions:  If any biopsies were taken, your doctors clinic will contact you in 1 to 2   weeks with any results.  - Discharge patient to home.   - Resume previous diet.   - Continue present medications.   - Repeat colonoscopy in 10 years for screening purposes.   - Condition stable   - The signs and symptoms of potential delayed complications were discussed   with the patient. If signs or symptoms of these complications develop, call   the [facility] at [phone].   - Return to normal activities tomorrow.  Written discharge instructions were   provided to the patient.  For questions, problems or results please call your physician - Jaime Vega MD.  EMERGENCY PHONE NUMBER: [phone],  LAB RESULTS: [phone]  IF A COMPLICATION OR EMERGENCY SITUATION ARISES AND YOU ARE UNABLE TO REACH   YOUR PHYSICIAN - GO DIRECTLY TO THE EMERGENCY ROOM.  MD Jaime Ibrahim MD  9/12/2022 10:29:49 AM  This report has been verified and signed electronically.  Dear patient,  As a result of recent federal legislation (The Federal Cures Act), you may   receive lab or pathology results from your procedure in your [facility]   account before  your physician is able to contact you. Your physician or   their representative will relay the results to you with their   recommendations at their soonest availability.  Thank you,  PROVATION

## 2022-09-12 NOTE — ANESTHESIA POSTPROCEDURE EVALUATION
Anesthesia Post Evaluation    Patient: Lila Gee    Procedure(s) Performed: Procedure(s) (LRB):  COLONOSCOPY (N/A)    Final Anesthesia Type: MAC      Patient location during evaluation: GI PACU  Patient participation: Yes- Able to Participate  Level of consciousness: awake and alert and awake  Post-procedure vital signs: reviewed and stable  Pain management: adequate  Airway patency: patent    PONV status at discharge: No PONV  Anesthetic complications: no      Cardiovascular status: blood pressure returned to baseline, hemodynamically stable and stable  Respiratory status: unassisted, spontaneous ventilation and room air  Hydration status: euvolemic  Follow-up not needed.          Vitals Value Taken Time   BP  09/12/22 0838   Temp  09/12/22 0838   Pulse  09/12/22 0838   Resp  09/12/22 0838   SpO2  09/12/22 0838     See nursing notes for vitals     No case tracking events are documented in the log.      Pain/Chapin Score: No data recorded

## 2022-09-12 NOTE — ANESTHESIA PREPROCEDURE EVALUATION
09/12/2022  Lila Gee is a 52 y.o., female for colonoscopy under MAC    Past Medical History:   Diagnosis Date    Anemia     Hypothyroidism        Past Surgical History:   Procedure Laterality Date    ESOPHAGOGASTRODUODENOSCOPY N/A 6/18/2022    Procedure: EGD (ESOPHAGOGASTRODUODENOSCOPY);  Surgeon: Jaime Vega MD;  Location: [facility];  Service: Endoscopy;  Laterality: N/A;    SINUS SURGERY         Pre-op Assessment    I have reviewed the Patient Summary Reports.     I have reviewed the Nursing Notes. I have reviewed the NPO Status.   I have reviewed the Medications.     Review of Systems  Anesthesia Hx:  No problems with previous Anesthesia  History of prior surgery of interest to airway management or planning: (sinus surgery) Denies Family Hx of Anesthesia complications.   Denies Personal Hx of Anesthesia complications.   Social:  Non-Smoker, No Alcohol Use    Hematology/Oncology:         -- Anemia: Hematology Comments: thrombocytosis   Cardiovascular:  Cardiovascular Normal Exercise tolerance: good     Pulmonary:  Pulmonary Normal  Denies Shortness of breath.    Renal/:  Renal/ Normal     Hepatic/GI:   PUD,    Neurological:  Neurology Normal    Endocrine:   Hypothyroidism        Physical Exam  General: Well nourished, Cooperative, Alert and Oriented    Airway:  Mallampati: II   Mouth Opening: Normal  TM Distance: Normal  Tongue: Normal  Neck ROM: Normal ROM    Dental:        Anesthesia Plan  Type of Anesthesia, risks & benefits discussed:    Anesthesia Type: MAC  Intra-op Monitoring Plan: Standard ASA Monitors  Informed Consent: Informed consent signed with the Patient and all parties understand the risks and agree with anesthesia plan.  All questions answered.   ASA Score: 2    Ready For Surgery From Anesthesia Perspective.     .

## 2022-09-12 NOTE — TRANSFER OF CARE
"Anesthesia Transfer of Care Note    Patient: Lila Gee    Procedure(s) Performed: Procedure(s) (LRB):  COLONOSCOPY (N/A)    Patient location: GI    Anesthesia Type: MAC    Transport from OR: Transported from OR on room air with adequate spontaneous ventilation    Post pain: adequate analgesia    Post assessment: no apparent anesthetic complications and tolerated procedure well    Post vital signs: stable    Level of consciousness: awake    Nausea/Vomiting: no nausea/vomiting    Complications: none          Last vitals:   Visit Vitals  /84   Pulse 72   Temp 36.4 °C (97.5 °F)   Resp 18   Ht 5' 3" (1.6 m)   Wt 61.2 kg (135 lb)   LMP 06/29/2016   SpO2 99%   Breastfeeding No   BMI 23.91 kg/m²     "

## 2022-09-13 ENCOUNTER — TELEPHONE (OUTPATIENT)
Dept: ENDOSCOPY | Facility: HOSPITAL | Age: 52
End: 2022-09-13
Payer: COMMERCIAL

## 2022-09-13 NOTE — TELEPHONE ENCOUNTER
Post procedure f/u call, no answer, message left to call [phone] for any post procedure concerns

## 2022-09-15 RX ORDER — PANTOPRAZOLE SODIUM 40 MG/1
40 TABLET, DELAYED RELEASE ORAL 2 TIMES DAILY
Qty: 60 TABLET | Refills: 1 | Status: SHIPPED | OUTPATIENT
Start: 2022-09-15 | End: 2022-10-17

## 2022-09-15 NOTE — TELEPHONE ENCOUNTER
----- Message from Mary Topete sent at 9/15/2022  9:59 AM CDT -----  Type:  RX Refill Request    Who Called:  pt  Refill or New Rx: refill   RX Name and Strength: pantoprazole (PROTONIX) 40 MG tablet  How is the patient currently taking it? (ex. 1XDay): Take 1 tablet (40 mg total) by mouth 2 (two) times daily.  Is this a 30 day or 90 day RX:60  Preferred Pharmacy with phone number: [facility] [address] [facility] [address] [facility]   Phone: [phone]  Fax:  [phone]  Local or Mail Order: local  Ordering Provider: Kelton Crespo   Would the patient rather a call back or a response via [facility]?  call  Best Call Back Number: [phone]  Additional Information:  pt said she is completely out and was told after procedure she would get refill

## 2022-09-17 PROBLEM — Z12.11 COLON CANCER SCREENING: Status: ACTIVE | Noted: 2022-09-17

## 2023-03-08 ENCOUNTER — CLINICAL SUPPORT (OUTPATIENT)
Dept: OTHER | Facility: CLINIC | Age: 53
End: 2023-03-08

## 2023-03-08 DIAGNOSIS — Z00.8 ENCOUNTER FOR OTHER GENERAL EXAMINATION: ICD-10-CM

## 2023-03-09 VITALS
DIASTOLIC BLOOD PRESSURE: 74 MMHG | WEIGHT: 135 LBS | BODY MASS INDEX: 23.92 KG/M2 | SYSTOLIC BLOOD PRESSURE: 122 MMHG | HEIGHT: 63 IN

## 2023-03-09 LAB
GLUCOSE SERPL-MCNC: 82 MG/DL (ref 60–140)
HDLC SERPL-MCNC: 44 MG/DL
POC CHOLESTEROL, LDL (DOCK): 97 MG/DL
POC CHOLESTEROL, TOTAL: 176 MG/DL
TRIGL SERPL-MCNC: 206 MG/DL

## 2024-03-21 ENCOUNTER — CLINICAL SUPPORT (OUTPATIENT)
Dept: OTHER | Facility: CLINIC | Age: 54
End: 2024-03-21

## 2024-03-21 DIAGNOSIS — Z00.8 ENCOUNTER FOR OTHER GENERAL EXAMINATION: ICD-10-CM

## 2024-03-22 VITALS
SYSTOLIC BLOOD PRESSURE: 134 MMHG | HEIGHT: 63 IN | WEIGHT: 122 LBS | DIASTOLIC BLOOD PRESSURE: 86 MMHG | BODY MASS INDEX: 21.62 KG/M2

## 2024-03-22 LAB
HDLC SERPL-MCNC: 51 MG/DL
POC CHOLESTEROL, LDL (DOCK): 117 MG/DL
POC CHOLESTEROL, TOTAL: 188 MG/DL
POC GLUCOSE, FASTING: 83 MG/DL (ref 60–110)
TRIGL SERPL-MCNC: 112 MG/DL

## 2024-06-16 NOTE — H&P
[facility]  History & Physical  Critical Care       Admit Date: 6/18/2022   LOS: 0 days     Chief Complaint/Reason for Admission:    HPI    Lila Gee is a 51 year old female with Pmhx of headaches and thrombocytosis presenting to OSH for same day onset of abdominal pain with emesis. Patient reports she was at work when she developed quick onset of nausea, coffee ground emesis and diaphoresis. Patient immediately wheeled to the ED and workup included CT head without contrast that was negative. Occult blood positive. Patients Hgb 9.4/29.9; NGT placed and noted dark ground return with BR blood. Patient typed and screened. 2 bore iv's obtained and PPI IV started. Patient transferred [place] [facility] for [place] admission for upper GI bleed.      Patient denies any previous episodes of hemoptysis or GI bleeds. Patient reports over 10 years ago of BC goodys powder use. Patient denies any use currently. Patient reports previously worked up for bleeding, with no source noted or discovered. Patient discharged on iron supplements. Patient reports following with PCP and informed to stop taking po Iron supplements. Patient reports previously followed by heme/onc for thrombocytosis. Patient denies any alcohol use, denies any tobacco use or drug use. Patient denies any NSAID use as well.       PMHx  Past Medical History:   Diagnosis Date    Anemia     Hypothyroidism         PSHx  Past Surgical History:   Procedure Laterality Date    SINUS SURGERY         Family History  Family History   Problem Relation Age of Onset    Fibromyalgia Mother     Irritable bowel syndrome Mother     Fibromyalgia Sister     Diabetes Brother        Social  Social History     Socioeconomic History    Marital status: Single   Occupational History    Occupation: lpn   Tobacco Use    Smoking status: Never Smoker    Smokeless tobacco: Never Used   Substance and Sexual Activity    Alcohol use: No    Drug use: No    Sexual activity: Not  Currently     Partners: Male     Birth control/protection: Condom       Home Medications  Current Facility-Administered Medications on File Prior to Encounter   Medication Dose Route Frequency Provider Last Rate Last Admin    [COMPLETED] LIDOcaine HCl 2% oral solution 5 mL  5 mL Oral ED 1 Time Rupert Leiva MD   5 mL at 06/17/22 2016    [COMPLETED] ondansetron injection 4 mg  4 mg Intravenous ED 1 Time Jadyn Godwin PA-C   4 mg at 06/17/22 1616    [COMPLETED] pantoprazole injection 80 mg  80 mg Intravenous ED 1 Time Rupert Leiva MD   80 mg at 06/17/22 2017    [COMPLETED] sodium chloride 0.9% bolus 1,000 mL  1,000 mL Intravenous ED 1 Time Jadyn Godwin PA-C   Stopped at 06/17/22 1706    [DISCONTINUED] pantoprazole 40 mg in  mL infusion (ready to mix system)  8 mg/hr Intravenous Continuous Rupert Leiva MD   Stopped at 06/18/22 0145     Current Outpatient Medications on File Prior to Encounter   Medication Sig Dispense Refill    [DISCONTINUED] aspirin (ECOTRIN) 81 MG EC tablet Take 1 tablet (81 mg total) by mouth once daily. 150 tablet 2    [DISCONTINUED] ferrous sulfate 325 mg (65 mg iron) Tab tablet Take 325 mg by mouth daily with breakfast.         Allergies  Review of patient's allergies indicates:  No Known Allergies    ROS- As per HPI, otherwise negative  Review of Systems   Constitutional: Negative for diaphoresis and fever.   Respiratory: Negative for cough and wheezing.    Cardiovascular: Negative for palpitations and leg swelling.   Gastrointestinal: Positive for vomiting. Negative for blood in stool and heartburn.   Psychiatric/Behavioral: Negative for depression.       Objective  Vitals(Most Recent)      Temp: 98.5 °F (36.9 °C) (06/18/22 0318)  Pulse: 95 (06/18/22 0318)  Resp: 18 (06/18/22 0318)  BP: 123/81 (06/18/22 0318)  SpO2: 100 % (06/18/22 0318)           Vitals Range  Temp:  [97.7 °F (36.5 °C)-98.5 °F (36.9 °C)]   Pulse:  [66-95]   Resp:  [18-20]   BP:  ()/(42-96)   SpO2:  [95 %-100 %]     I/O  No intake/output data recorded.  No intake or output data in the 24 hours ending 06/18/22 0618      Physical Exam  Physical Exam  Vitals reviewed.   Constitutional:       Appearance: Normal appearance.   HENT:      Head: Normocephalic and atraumatic.      Nose:      Comments: NGT in place     Mouth/Throat:      Mouth: Mucous membranes are moist.   Eyes:      General:         Right eye: No discharge.         Left eye: No discharge.   Cardiovascular:      Rate and Rhythm: Normal rate and regular rhythm.      Pulses: Normal pulses.      Heart sounds: Normal heart sounds.   Pulmonary:      Effort: Pulmonary effort is normal.      Breath sounds: Normal breath sounds.   Abdominal:      General: Abdomen is flat. There is no distension.      Palpations: There is no mass.      Tenderness: There is no abdominal tenderness.   Skin:     Capillary Refill: Capillary refill takes less than 2 seconds.   Neurological:      General: No focal deficit present.      Mental Status: She is alert.      Cranial Nerves: No cranial nerve deficit.   Psychiatric:         Mood and Affect: Mood normal.         Behavior: Behavior normal.             Labs  [unfilled]    No results found for: INR, PROTIME, APTT    Recent Labs     06/17/22  1628   TROPONINI 0.006   CPK 84       A1c: No results found for: HGBA1C, Last Gluc:   Recent Labs   Lab 06/17/22  1605   POCTGLUCOSE 135*       TSH:   Lab Results   Component Value Date    TSH 3.990 06/17/2022       UA  Urinalysis  Recent Labs   Lab 06/17/22  1712   COLORU Yellow   SPECGRAV 1.015   PHUR 7.0   PROTEINUA Negative   NITRITE Negative   LEUKOCYTESUR 2+*   UROBILINOGEN Negative     Recent Labs   Lab 06/17/22  1712   COLORU Yellow   SPECGRAV 1.015   PHUR 7.0   PROTEINUA Negative       Micro  Microbiology Results (last 7 days)     ** No results found for the last 168 hours. **           ABG  No results for input(s): PH, PCO2, PO2, HCO3, POCSATURATED, BE in the  last 168 hours.    Imaging        Assessment/Plan:  Lila Gee is a 51 y.o. female patient w/ PMHx of headaches and thrombocytosis    Here with   1. Upper GI bleed  2.     Neuro/Psych  #headaches  Takes Excedrin and topamax   No acute issues at this time  Continue to monitor    CV  No acute issues at this time      Pulm  No acute issues at this time      FEN/GI  #Upper GI bleed  Onset same day PTA  Reports previous episode of anemia, with undefined cause  Previously on iron supplements and was discontinued  Denies any black or tarry stool  Coffee ground emesis episode prior to arrival  NGT placed in ED with bright and dark red return; on intermittent suction  PPI IV   Maintain 2 bore ivs  Cardiac telemetry  GI consult, recs appreciated  Maintain T&S  Maintain Hgb>7   Diet: NPO  Fluid restriction   Keep Mg 2, K 4  Zofran prn for n/v     RENAL  undocumented  Strict I&Os  Avoid renal toxic meds  BUN/Cr: 45/0.7, baseline 13/0.6  Continue to monitor renal status and urine output     Heme  #thrombocytosis  Hx of Plts to 900s  Evaluated by Heme/onc previously  No intervention    H/H 9.4/29.9; stable  WBC 6.61  DVT prophylaxis: SCD    Endo  No acute issues at this time  Maintain glucose 140-180  SSI  HgbA1C:pending  TSH: 3.99    ID  No acute issues at this time      DVT PPx: none  Diet: NPO  GI PPX: IV Protonix  Deconditioning: n/a  Code Status: Full      Dispo  - GI evaluation, stable Hg/Hct      6/18/2022 Lata Huddleston M.D., -II  [facility]  [facility]   [facility]

## 2025-03-25 ENCOUNTER — CLINICAL SUPPORT (OUTPATIENT)
Dept: OTHER | Facility: CLINIC | Age: 55
End: 2025-03-25

## 2025-03-25 DIAGNOSIS — Z00.8 ENCOUNTER FOR OTHER GENERAL EXAMINATION: ICD-10-CM

## 2025-03-26 VITALS
HEIGHT: 64 IN | DIASTOLIC BLOOD PRESSURE: 88 MMHG | SYSTOLIC BLOOD PRESSURE: 126 MMHG | WEIGHT: 128 LBS | BODY MASS INDEX: 21.85 KG/M2

## 2025-03-26 LAB
HDLC SERPL-MCNC: 43 MG/DL
POC CHOLESTEROL, LDL (DOCK): 108 MG/DL
POC CHOLESTEROL, TOTAL: 173 MG/DL
POC GLUCOSE, FASTING: 74 MG/DL (ref 60–110)
TRIGL SERPL-MCNC: 123 MG/DL